# Patient Record
Sex: MALE | Race: WHITE | Employment: FULL TIME | URBAN - METROPOLITAN AREA
[De-identification: names, ages, dates, MRNs, and addresses within clinical notes are randomized per-mention and may not be internally consistent; named-entity substitution may affect disease eponyms.]

---

## 2017-01-04 ENCOUNTER — HOSPITAL ENCOUNTER (OUTPATIENT)
Dept: PHYSICAL THERAPY | Age: 27
Discharge: HOME | End: 2017-01-04
Payer: COMMERCIAL

## 2017-01-04 PROCEDURE — 97110 THERAPEUTIC EXERCISES: CPT

## 2017-01-04 PROCEDURE — 97140 MANUAL THERAPY 1/> REGIONS: CPT

## 2017-01-04 NOTE — PROGRESS NOTES
PHYSICAL THERAPY - DAILY TREATMENT NOTE    Patient Name: Merrill Ochoa        Date: 2017  : 1990   YES Patient  Verified  Visit #:     Insurance: Payor: Taj Ferraro / Plan: Matt Bourne / Product Type: HMO /      In time: 8281 Out time: 286   Total Treatment Time: 70     Medicare Time Tracking (below)   Total Timed Codes (min):   1:1 Treatment Time:       TREATMENT AREA =  Left shoulder pain [M25.512]  Shoulder instability, left [M25.312]  SUBJECTIVE  Pain Level (on 0 to 10 scale):  4  / 10   Medication Changes/New allergies or changes in medical history, any new surgeries or procedures?     NO    If yes, update Summary List   Subjective Functional Status/Changes:  []  No changes reported     My shoulder is the most sore during the day       OBJECTIVE  Modalities Rationale:     decrease inflammation, decrease pain and increase tissue extensibility to improve patient's ability to perform functional ADLs   min [] Estim, type/location:                                      []  att     []  unatt     []  w/US     []  w/ice    []  w/heat    min []  Mechanical Traction: type/lbs                   []  pro   []  sup   []  int   []  cont    []  before manual    []  after manual    min []  Ultrasound, settings/location:      min []  Iontophoresis w/ dexamethasone, location:                                               []  take home patch       []  in clinic   10 min [x]  Ice     []  Heat    location/position: L shoulder in supine    Min []  Vasopneumatic Device, press/temp:     min []  Other:    [] Skin assessment post-treatment (if applicable):    []  intact    []  redness- no adverse reaction     []redness - adverse reaction:        45 min Therapeutic Exercise:  [x]  See flow sheet   Rationale:      increase ROM and increase strength to improve the patients ability to regain full functional mobility of L shoulder for ADLs and RTW     15 min Manual Therapy: PROM in all planes   Rationale: decrease pain, increase ROM, increase tissue extensibility and decrease trigger points to improve the patient's ability to regain full functional mobility     min Therapeutic Activity:    Rationale:    increase strength, improve coordination and increase proprioception to improve the patients ability to      min Neuromuscular Re-ed:    Rationale:    improve coordination, improve balance and increase proprioception to improve the patients ability to      min Gait Training:    Rationale:       min Patient Education:  YES  Reviewed HEP   [x]  Progressed/Changed HEP based on:   Issued written HEP and reviewed     Other Objective/Functional Measures: Added sleeper stretch  Continues to have end range flexion and ER tightness    VC to maintain scapular depression during scaption >90°     Post Treatment Pain Level (on 0 to 10) scale:   0  / 10     ASSESSMENT  Assessment/Changes in Function:     Progressed treatment as appropriate with good patient tolerance, PROM remains limited     []  See Progress Note/Recertification   Patient will continue to benefit from skilled PT services to modify and progress therapeutic interventions, address functional mobility deficits, address ROM deficits, address strength deficits, analyze and address soft tissue restrictions, analyze and cue movement patterns, analyze and modify body mechanics/ergonomics and assess and modify postural abnormalities to attain remaining goals.    Progress toward goals / Updated goals:    Good progress towards strength goals     PLAN  [x]  Upgrade activities as tolerated YES Continue plan of care   []  Discharge due to :    []  Other:      Therapist: Norbert Smith PT, DPT, MTC, CMTPT    Date: 1/4/2017 Time: 6:55 PM     Future Appointments  Date Time Provider Jeannine Garcia   1/11/2017 11:00 AM Rodolfo Salinas PT [facility] [place]   1/18/2017 12:00 PM Rodolfo Salinas PT [facility] [place]   1/25/2017 12:00 PM Rodolfo Salinas PT [facility] [place]

## 2017-01-11 ENCOUNTER — HOSPITAL ENCOUNTER (OUTPATIENT)
Dept: PHYSICAL THERAPY | Age: 27
Discharge: HOME | End: 2017-01-11
Payer: COMMERCIAL

## 2017-01-11 PROCEDURE — 97110 THERAPEUTIC EXERCISES: CPT

## 2017-01-11 PROCEDURE — 97140 MANUAL THERAPY 1/> REGIONS: CPT

## 2017-01-12 NOTE — PROGRESS NOTES
PHYSICAL THERAPY - DAILY TREATMENT NOTE    Patient Name: Mable Allen        Date: 2017  : 1990   YES Patient  Verified  Visit #:     Insurance: Payor: Skye Mckenna / Plan: Nubia Rascon / Product Type: HMO /      In time: 1110 Out time: 65   Total Treatment Time: 70     Medicare Time Tracking (below)   Total Timed Codes (min):   1:1 Treatment Time:       TREATMENT AREA =  Left shoulder pain [M25.512]  Shoulder instability, left [M25.312]  SUBJECTIVE  Pain Level (on 0 to 10 scale):  4  / 10   Medication Changes/New allergies or changes in medical history, any new surgeries or procedures?     NO    If yes, update Summary List   Subjective Functional Status/Changes:  []  No changes reported     i do the exercises once a day and maybe the wall stretches every other day bc they make me sore       OBJECTIVE  Modalities Rationale:     decrease inflammation, decrease pain and increase tissue extensibility to improve patient's ability to perform functional ADLs   min [] Estim, type/location:                                      []  att     []  unatt     []  w/US     []  w/ice    []  w/heat    min []  Mechanical Traction: type/lbs                   []  pro   []  sup   []  int   []  cont    []  before manual    []  after manual    min []  Ultrasound, settings/location:      min []  Iontophoresis w/ dexamethasone, location:                                               []  take home patch       []  in clinic   10 min [x]  Ice     []  Heat    location/position: L shoulder in supine    Min []  Vasopneumatic Device, press/temp:     min []  Other:    [] Skin assessment post-treatment (if applicable):    []  intact    []  redness- no adverse reaction     []redness - adverse reaction:        45 min Therapeutic Exercise:  [x]  See flow sheet   Rationale:      increase ROM and increase strength to improve the patients ability to regain full functional mobility of L shoulder for ADLs and RTW     15 min Manual Therapy: PROM in all planes   Rationale:      decrease pain, increase ROM, increase tissue extensibility and decrease trigger points to improve the patient's ability to regain full functional mobility     min Therapeutic Activity:    Rationale:    increase strength, improve coordination and increase proprioception to improve the patients ability to      min Neuromuscular Re-ed:    Rationale:    improve coordination, improve balance and increase proprioception to improve the patients ability to      min Gait Training:    Rationale:       min Patient Education:  YES  Reviewed HEP   [x]  Progressed/Changed HEP based on:   Issued written HEP and reviewed     Other Objective/Functional Measures:  Reviewed importance of performing stretches in pain free ROM only to avoid inc inflammation/pain    Recommended inc frequency of stretching HEP to 2-3x/day due to cont dec in ROM    Added body blade     Post Treatment Pain Level (on 0 to 10) scale:   0  / 10     ASSESSMENT  Assessment/Changes in Function:     Progressed treatment as appropriate with good patient tolerance, PROM remains limited     []  See Progress Note/Recertification   Patient will continue to benefit from skilled PT services to modify and progress therapeutic interventions, address functional mobility deficits, address ROM deficits, address strength deficits, analyze and address soft tissue restrictions, analyze and cue movement patterns, analyze and modify body mechanics/ergonomics and assess and modify postural abnormalities to attain remaining goals.    Progress toward goals / Updated goals:    Slow but steady progress towards ROM goals     PLAN  [x]  Upgrade activities as tolerated YES Continue plan of care   []  Discharge due to :    []  Other:      Therapist: Sona Javier PT, DPT, MTC, CMTPT    Date: 1/11/2017 Time: 6:55 PM     Future Appointments  Date Time Provider Jeannine Garcia   1/18/2017 12:00 PM Oracioia Meenakshi KASPER PTA [facility] [place] [facility] 1/25/2017 12:00 PM Merlin Reynolds, PT [facility] [place]

## 2017-01-18 ENCOUNTER — APPOINTMENT (OUTPATIENT)
Dept: PHYSICAL THERAPY | Age: 27
End: 2017-01-18
Payer: COMMERCIAL

## 2017-01-20 ENCOUNTER — HOSPITAL ENCOUNTER (OUTPATIENT)
Dept: PHYSICAL THERAPY | Age: 27
Discharge: HOME | End: 2017-01-20
Payer: COMMERCIAL

## 2017-01-20 PROCEDURE — 97140 MANUAL THERAPY 1/> REGIONS: CPT

## 2017-01-20 PROCEDURE — 97110 THERAPEUTIC EXERCISES: CPT

## 2017-01-20 NOTE — PROGRESS NOTES
PHYSICAL THERAPY - DAILY TREATMENT NOTE    Patient Name: Cha Guzman        Date: 2017  : 1990   YES Patient  Verified  Visit #:   15   of      Insurance: Payor: Isi Murillo / Plan: Faith Guerra / Product Type: HMO /      In time: 4 Out time: 515   Total Treatment Time: 75     TREATMENT AREA =  Left shoulder pain [M25.512]  Shoulder instability, left [M25.312]    SUBJECTIVE  Pain Level (on 0 to 10 scale):  0  / 10   Medication Changes/New allergies or changes in medical history, any new surgeries or procedures?     NO    If yes, update Summary List   Subjective Functional Status/Changes:  []  No changes reported     Functional improvements: easier to lift overhead without compensation   Functional impairments: full AROM       OBJECTIVE  Modalities Rationale:     decrease inflammation and decrease pain to improve patient's ability to perform chores this evening   min [] Estim, type/location:                                      []  att     []  unatt     []  w/US     []  w/ice    []  w/heat    min []  Mechanical Traction: type/lbs                   []  pro   []  sup   []  int   []  cont    []  before manual    []  after manual    min []  Ultrasound, settings/location:      min []  Iontophoresis w/ dexamethasone, location:                                               []  take home patch       []  in clinic   10 min [x]  Ice     []  Heat    location/position:     min []  Vasopneumatic Device, press/temp:     min []  Other:    [] Skin assessment post-treatment (if applicable):    []  intact    []  redness- no adverse reaction     []redness - adverse reaction:        15 min Manual Therapy: Technique:      [x] S/DTM []IASTM [x]PROM [] Passive Stretching   []manual TPR    []Jt manipulation:Gr I [] II []  III [] IV[] V[]  Treatment Area:     Rationale:      decrease pain, increase ROM and increase tissue extensibility to improve patient's ability to raise arm overhead     50 min Therapeutic Exercise: [x]  See flow sheet   Rationale:      increase ROM, increase strength, improve coordination and increase proprioception to improve the patients ability to increased activity tolerance          min Neuromuscular Re-ed:    Rationale:    increase ROM to improve the patients ability to          min Therapeutic Activity:    Rationale:    increase ROM to improve the patients ability to         min Gait Training:    Rationale:         min Patient Education:  YES  Reviewed HEP   []  Progressed/Changed HEP based on: Other Objective/Functional Measures:    Compliant and independent with HEP  PROM  deg, ER 45 @60,      Post Treatment Pain Level (on 0 to 10) scale:   0  / 10     ASSESSMENT  Assessment/Changes in Function:     Patient tolerated treatment well today. PROM remains limited     []  See Progress Note/Recertification   Patient will continue to benefit from skilled PT services to modify and progress therapeutic interventions, address functional mobility deficits, address ROM deficits, address strength deficits, analyze and modify body mechanics/ergonomics and assess and modify postural abnormalities to attain remaining goals.    Progress toward goals / Updated goals:    Slow but steady progress towards ROM goals     PLAN  []  Upgrade activities as tolerated YES Continue plan of care   []  Discharge due to :    []  Other:      Therapist: Ellie Salas PTA    Date: 1/20/2017 Time: 4:42 PM     Future Appointments  Date Time Provider Jeannine Garcia   1/25/2017 12:00 PM Coy Mello PT [facility] [place]

## 2017-01-23 ENCOUNTER — HOSPITAL ENCOUNTER (OUTPATIENT)
Dept: PHYSICAL THERAPY | Age: 27
Discharge: HOME | End: 2017-01-23
Payer: COMMERCIAL

## 2017-01-23 PROCEDURE — 97140 MANUAL THERAPY 1/> REGIONS: CPT

## 2017-01-23 PROCEDURE — 97110 THERAPEUTIC EXERCISES: CPT

## 2017-01-25 ENCOUNTER — APPOINTMENT (OUTPATIENT)
Dept: PHYSICAL THERAPY | Age: 27
End: 2017-01-25
Payer: COMMERCIAL

## 2017-01-27 NOTE — PROGRESS NOTES
[facility] [address] [place] [address] [facility], [address] [place] [address], [address]  Phone: [phone]  Fax: [phone]  PROGRESS NOTE  Patient Name: Wily Nj : 1990   Treatment/Medical Diagnosis: Left shoulder pain [M25.512]  Shoulder instability, left [M25.312]   Referral Source: Misty Brody,*     Date of Initial Visit: 16 Attended Visits: 14 Missed Visits:      SUMMARY OF TREATMENT  Therapeutic exercise including ROM, stretching, gentle strengthening, scapular stabilization training, postural ed, patient education, HEP instruction, CP, manual therapy therapy including passive stretching. CURRENT STATUS  Mr. Margareth Malodnado was last seen for PT on 17 & continues to report fairly constant c/o pain in L shoulder, passive mobility of shoulder continues to be limited with good tolerance to manual stretching. He continues to have weakness with active elevation & slight scapular substitution with active elevation. Slow but steady progress with PT. Goal/Measure of Progress Goal Met? 1. Improve L shoulder AROM for flex/scaption to 130 degrees in standing, [email or web address] in supine to 45 deg, [email or web address] level of L4 so ROM is available for dressing activities and/or overhead reaching. Status at last Eval: PROM (supine)  [email or web address]: 40   Flex 155 deg Current Status: PROM: flex 160  ABD: 90 deg  [email or web address]: 60 deg progressing   2. Improve overall strength of L shoulder to 4/5 so strength is available for return to light lifting activities at work/home. Status at last Eval: NA Current Status: TBA progressing   3. Improve FOTO score from 59 points to > or = 65 points indicating improved tolerance with ADLs in regards to L shoulder. Status at last Eval: 59 points Current Status: TBA progressing     New Goals to be achieved in __4__  weeks:  1.   Patient to report 50% improvement in overall function in preparation for return to recreational activities with manageable sx in L shoulder. 2.  Improve L shoulder AROM for flex/scaption to 130 degrees in standing, [email or web address] in supine to 45 deg, [email or web address] level of L4 so ROM is available for dressing activities and/or overhead reaching. 3.  Improve overall strength of L shoulder to 4/5 so strength is available for return to light lifting activities at work/home. 4.  Improve FOTO score from 59 points to > or = 65 points indicating improved tolerance with ADLs in regards to L shoulder. G-Codes (GP): NA  RECOMMENDATIONS  Patient to continue with PT for up to 3-4 more weeks in order to progress towards achieving all LTGs. If you have any questions/comments please contact us directly at [phone]. Thank you for allowing us to assist in the care of your patient. Therapist Signature: YESENIA Peña, cert MDT Date: 3/90/4913     Time: 10:32 AM   NOTE TO PHYSICIAN:  PLEASE COMPLETE THE ORDERS BELOW AND FAX TO   [facility]: [phone]. If you are unable to process this request in 24 hours please contact our office: [phone].    ___ I have read the above report and request that my patient continue as recommended.   ___ I have read the above report and request that my patient continue therapy with the following changes/special instructions:_________________________________________________________   ___ I have read the above report and request that my patient be discharged from therapy.      Physician Signature:        Date:       Time:

## 2017-01-30 ENCOUNTER — HOSPITAL ENCOUNTER (OUTPATIENT)
Dept: PHYSICAL THERAPY | Age: 27
Discharge: HOME | End: 2017-01-30
Payer: COMMERCIAL

## 2017-01-30 PROCEDURE — 97110 THERAPEUTIC EXERCISES: CPT

## 2017-01-30 PROCEDURE — 97140 MANUAL THERAPY 1/> REGIONS: CPT

## 2017-01-30 NOTE — PROGRESS NOTES
PHYSICAL THERAPY - DAILY TREATMENT NOTE    Patient Name: Garo Sprague        Date: 2017  : 1990   YES Patient  Verified  Visit #:   15   of   30  Insurance: Payor: Ron Palomares / Plan: Ellie Hands / Product Type: HMO /      In time: 710 A Out time: 8:30 A   Total Treatment Time: 80/40     Medicare Time Tracking (below)   Total Timed Codes (min):  NA 1:1 Treatment Time:  NA     TREATMENT AREA =  L shoulder    SUBJECTIVE  Pain Level (on 0 to 10 scale):  0  / 10   Medication Changes/New allergies or changes in medical history, any new surgeries or procedures? NO    If yes, update Summary List   Subjective Functional Status/Changes:  []  No changes reported     Patient reports f/u with MD went well, he would like him to continue with PT to work on his strength.           OBJECTIVE  Modalities Rationale:     decrease pain to improve patient's ability to return to lifting    min [] Estim, type/location:                                      []  att     []  unatt     []  w/US     []  w/ice    []  w/heat    min []  Mechanical Traction: type/lbs                   []  pro   []  sup   []  int   []  cont    []  before manual    []  after manual    min []  Ultrasound, settings/location:      min []  Iontophoresis w/ dexamethasone, location:                                               []  take home patch       []  in clinic   10 min [x]  Ice     []  Heat    location/position: Supine to L shoulder     min []  Vasopneumatic Device, press/temp:     min []  Other:    [] Skin assessment post-treatment (if applicable):    []  intact    []  redness- no adverse reaction     []redness - adverse reaction:        60/  30 min Therapeutic Exercise:  [x]  See flow sheet   Rationale:      increase ROM and increase strength to improve the patients ability to return to pain-free llifting      10 min Manual Therapy: Gentle manual stretch for scaption, ER, inf GHJ mob in supine    Rationale:      decrease pain and increase ROM to improve patient's ability to return to indep dressing      min Therapeutic Activity:    Rationale:      min Neuromuscular Re-ed:    Rationale:        min Gait Training:    Rationale:       min Patient Education:  YES  Reviewed HEP   []  Progressed/Changed HEP based on: Other Objective/Functional Measures:    Progressed PREs per flow sheet     Post Treatment Pain Level (on 0 to 10) scale:   0  / 10     ASSESSMENT  Assessment/Changes in Function:   Good tolerance to strength progressions today      []  See Progress Note/Recertification   Patient will continue to benefit from skilled PT services to modify and progress therapeutic interventions, address functional mobility deficits, address ROM deficits, analyze and address soft tissue restrictions and instruct in home and community integration to attain remaining goals. Progress toward goals / Updated goals:    Progressing towards newly established LTGs     PLAN  [x]  Upgrade activities as tolerated YES Continue plan of care   []  Discharge due to :    []  Other:      Therapist: Roma King PT    Date: 1/30/2017 Time: 7:45 AM     No future appointments.

## 2017-02-01 ENCOUNTER — HOSPITAL ENCOUNTER (OUTPATIENT)
Dept: PHYSICAL THERAPY | Age: 27
Discharge: HOME | End: 2017-02-01
Payer: COMMERCIAL

## 2017-02-01 PROCEDURE — 97110 THERAPEUTIC EXERCISES: CPT

## 2017-02-01 PROCEDURE — 97140 MANUAL THERAPY 1/> REGIONS: CPT

## 2017-02-01 NOTE — PROGRESS NOTES
PHYSICAL THERAPY - DAILY TREATMENT NOTE      Patient Name: Gino Colon        Date: 2017  : 1990   YES Patient  Verified  Visit #:     Insurance: Payor: Chele Mcfadden / Plan: Dianne Younger / Product Type: HMO /      In time: 2:55 Out time: 4:05   Total Treatment Time: 70     Medicare Time Tracking (below)   Total Timed Codes (min):   1:1 Treatment Time:       TREATMENT AREA =  L Shoulder    SUBJECTIVE    Pain Level (on 0 to 10 scale):  0  / 10   Medication Changes/New allergies or changes in medical history, any new surgeries or procedures? NO    If yes, update Summary List   Subjective Functional Status/Changes:  []  No changes reported     \"I still have pain and trouble reaching behind my back and if I try to rest my arm over my forehead. \"         OBJECTIVE    Modalities Rationale:  decrease pain to improve patient's ability to return to lifting       min [] Estim, type/location:                                      []  att     []  unatt     []  w/US     []  w/ice    []  w/heat    min []  Mechanical Traction: type/lbs                   []  pro   []  sup   []  int   []  cont    []  before manual    []  after manual    min []  Ultrasound, settings/location:      min []  Iontophoresis w/ dexamethasone, location:                                               []  take home patch       []  in clinic   10 min [x]  Ice     []  Heat    location/position: Supine to L Shoulder    min []  Vasopneumatic Device, press/temp:     min []  Other:    [x] Skin assessment post-treatment (if applicable):    [x]  intact    []  redness- no adverse reaction     []redness - adverse reaction:      50 min Therapeutic Exercise:  [x]  See flow sheet   Rationale:   increase ROM and increase strength to improve the patients ability to return to pain-free llifting       min Therapeutic Activity:    Rationale:      min Neuromuscular Re-ed:    Rationale:     10 min Manual Therapy: Gentle manual stretch for scaption, ER, inf GHJ mob in supine    Rationale:   decrease pain and increase ROM to improve patient's ability to return to indep dressing      min Gait Training:    Rationale:      X min Patient Education:  YES  Reviewed HEP   []  Progressed/Changed HEP based on: Other Objective/Functional Measures:    L Shoulder PROM: Flex:160 Abd:90  [email or web address]  Initiated SL horiz abd with 1# and flex/abd @ wall. Post Treatment Pain Level (on 0 to 10) scale:   0  / 10     ASSESSMENT    Assessment/Changes in Function:     Both P/AROM remain limited with tight endranges into flex/abd. Improved reach behind back to T11.     []  See Progress Note/Recertification   Patient will continue to benefit from skilled PT services to modify and progress therapeutic interventions, address functional mobility deficits, address ROM deficits, analyze and address soft tissue restrictions and instruct in home and community integration to attain remaining goals. Progress toward goals / Updated goals:    Slow progress towards strength and ROM goals.       PLAN    [x]  Upgrade activities as tolerated YES Continue plan of care   []  Discharge due to :    []  Other:      Therapist: Julia Rodriguez PTA    Date: 2/1/2017 Time: 2:51 PM

## 2017-02-03 ENCOUNTER — APPOINTMENT (OUTPATIENT)
Dept: PHYSICAL THERAPY | Age: 27
End: 2017-02-03
Payer: COMMERCIAL

## 2017-02-06 ENCOUNTER — HOSPITAL ENCOUNTER (OUTPATIENT)
Dept: PHYSICAL THERAPY | Age: 27
Discharge: HOME | End: 2017-02-06
Payer: COMMERCIAL

## 2017-02-06 PROCEDURE — 97140 MANUAL THERAPY 1/> REGIONS: CPT

## 2017-02-06 PROCEDURE — 97110 THERAPEUTIC EXERCISES: CPT

## 2017-02-06 NOTE — PROGRESS NOTES
PHYSICAL THERAPY - DAILY TREATMENT NOTE    Patient Name: El Mccall        Date: 2017  : 1990   YES Patient  Verified  Visit #:     Insurance: Payor: Maryam Venegas / Plan: Angélica Jimenez / Product Type: HMO /      In time: 7:10 A Out time: 8:22 A   Total Treatment Time: 70/45     Medicare Time Tracking (below)   Total Timed Codes (min):  NA 1:1 Treatment Time:  NA     TREATMENT AREA =  L shoulder     SUBJECTIVE  Pain Level (on 0 to 10 scale):  0  / 10   Medication Changes/New allergies or changes in medical history, any new surgeries or procedures? NO    If yes, update Summary List   Subjective Functional Status/Changes:  []  No changes reported     Patient reports no new complaints since last treatment, feeling stronger with L shoulder.            OBJECTIVE  Modalities Rationale:     decrease pain to improve patient's ability to return to repetitive overhead activities   min [] Estim, type/location:                                      []  att     []  unatt     []  w/US     []  w/ice    []  w/heat    min []  Mechanical Traction: type/lbs                   []  pro   []  sup   []  int   []  cont    []  before manual    []  after manual    min []  Ultrasound, settings/location:      min []  Iontophoresis w/ dexamethasone, location:                                               []  take home patch       []  in clinic   10 min [x]  Ice     []  Heat    location/position: Supine to L shoulder     min []  Vasopneumatic Device, press/temp:     min []  Other:    [] Skin assessment post-treatment (if applicable):    []  intact    []  redness- no adverse reaction     []redness - adverse reaction:        50/  35 min Therapeutic Exercise:  [x]  See flow sheet   Rationale:      increase strength, improve balance and increase proprioception to improve the patients ability to return to light lifting      10 min Manual Therapy: Gentle manual stretching all planes, [email or web address] in supine   Rationale: Increase ROM so mobility is available for dressing     min Therapeutic Activity:    Rationale:         min Neuromuscular Re-ed:    Rationale:       min Gait Training:    Rationale:       min Patient Education:  YES  Reviewed HEP   []  Progressed/Changed HEP based on: Other Objective/Functional Measures:    TE per flow sheet      Post Treatment Pain Level (on 0 to 10) scale:   0  / 10     ASSESSMENT  Assessment/Changes in Function:     Good tolerance to current program, attempted prone 6 phase, difficulty with #2, given capsular tightness with ER     []  See Progress Note/Recertification   Patient will continue to benefit from skilled PT services to modify and progress therapeutic interventions, address functional mobility deficits, address ROM deficits, address strength deficits, assess and modify postural abnormalities, address imbalance/dizziness and instruct in home and community integration to attain remaining goals.    Progress toward goals / Updated goals:    Progressing towards STG 2     PLAN  [x]  Upgrade activities as tolerated YES Continue plan of care   []  Discharge due to :    []  Other:      Therapist: Geovanna Rubin, PT    Date: 2/6/2017 Time: 8:29 AM     Future Appointments  Date Time Provider Jeannine Garcia   2/8/2017 2:30 PM Pedro Escudero, PT [facility] [place]   2/13/2017 7:00 AM Geovanna Rubin, PT [facility] [place]   2/16/2017 9:00 AM Geovanna Rubin, PT [facility]

## 2017-02-08 ENCOUNTER — APPOINTMENT (OUTPATIENT)
Dept: PHYSICAL THERAPY | Age: 27
End: 2017-02-08
Payer: COMMERCIAL

## 2017-02-09 ENCOUNTER — HOSPITAL ENCOUNTER (OUTPATIENT)
Dept: PHYSICAL THERAPY | Age: 27
Discharge: HOME | End: 2017-02-09
Payer: COMMERCIAL

## 2017-02-09 ENCOUNTER — APPOINTMENT (OUTPATIENT)
Dept: PHYSICAL THERAPY | Age: 27
End: 2017-02-09
Payer: COMMERCIAL

## 2017-02-09 PROCEDURE — 97140 MANUAL THERAPY 1/> REGIONS: CPT

## 2017-02-09 PROCEDURE — 97110 THERAPEUTIC EXERCISES: CPT

## 2017-02-09 NOTE — PROGRESS NOTES
PHYSICAL THERAPY - DAILY TREATMENT NOTE      Patient Name: Latisha Carver        Date: 2017  : 1990   YES Patient  Verified  Visit #:      30  Insurance: Payor: Shahidanupama Laws / Plan: Hipolito Linton / Product Type: HMO /      In time: 10:00 Out time: 11:20   Total Treatment Time: 70     Medicare Time Tracking (below)   Total Timed Codes (min):   1:1 Treatment Time:       TREATMENT AREA =  L Shoulder    SUBJECTIVE    Pain Level (on 0 to 10 scale):  0  / 10   Medication Changes/New allergies or changes in medical history, any new surgeries or procedures? NO    If yes, update Summary List   Subjective Functional Status/Changes:  []  No changes reported     No new complaints. Primary c/o is tightness with reaching overhead and behind the back.       OBJECTIVE    Modalities Rationale: decrease pain to improve patient's ability to return to repetitive overhead activities       min [] Estim, type/location:                                      []  att     []  unatt     []  w/US     []  w/ice    []  w/heat    min []  Mechanical Traction: type/lbs                   []  pro   []  sup   []  int   []  cont    []  before manual    []  after manual    min []  Ultrasound, settings/location:      min []  Iontophoresis w/ dexamethasone, location:                                               []  take home patch       []  in clinic   10 min [x]  Ice     []  Heat    location/position: Supine to L Shoulder    min []  Vasopneumatic Device, press/temp:     min []  Other:    [x] Skin assessment post-treatment (if applicable):    [x]  intact    []  redness- no adverse reaction     []redness - adverse reaction:      50 min Therapeutic Exercise:  [x]  See flow sheet   Rationale:  increase strength, improve balance and increase proprioception to improve the patients ability to return to light lifting       min Therapeutic Activity:    Rationale:      min Neuromuscular Re-ed:    Rationale:     10 min Manual Therapy: Gentle manual stretching all planes, [email or web address] in supine   Rationale:  Increase ROM so mobility is available for dressing     min Gait Training:    Rationale:      X min Patient Education:  YES  Reviewed HEP   []  Progressed/Changed HEP based on: Other Objective/Functional Measures:    TE per flow sheet. Progressed reps with table TE. Post Treatment Pain Level (on 0 to 10) scale:   0  / 10     ASSESSMENT    Assessment/Changes in Function:     Capsular tighness cont with ExtRot>IntRot. Good tolerance to strength progressions. []  See Progress Note/Recertification   Patient will continue to benefit from skilled PT services to modify and progress therapeutic interventions, address functional mobility deficits, address ROM deficits, address strength deficits, assess and modify postural abnormalities, address imbalance/dizziness and instruct in home and community integration to attain remaining goals.    Progress toward goals / Updated goals:    Progressing towards STG 2     PLAN    [x]  Upgrade activities as tolerated YES Continue plan of care   []  Discharge due to :    []  Other:      Therapist: Yuki Martinez PTA    Date: 2/9/2017 Time: 7:27 AM

## 2017-02-13 ENCOUNTER — HOSPITAL ENCOUNTER (OUTPATIENT)
Dept: PHYSICAL THERAPY | Age: 27
Discharge: HOME | End: 2017-02-13
Payer: COMMERCIAL

## 2017-02-13 PROCEDURE — 97140 MANUAL THERAPY 1/> REGIONS: CPT

## 2017-02-13 PROCEDURE — 97110 THERAPEUTIC EXERCISES: CPT

## 2017-02-13 NOTE — PROGRESS NOTES
PHYSICAL THERAPY - DAILY TREATMENT NOTE    Patient Name: Jeri Prior        Date: 2017  : 1990   YES Patient  Verified  Visit #:     Insurance: Payor: Alyse Yip / Plan: Danielle Albright / Product Type: HMO /      In time: 7:10 A Out time: 8:30 A   Total Treatment Time: 75/35     Medicare Time Tracking (below)   Total Timed Codes (min):  NA 1:1 Treatment Time:  NA     TREATMENT AREA =  L shoulder    SUBJECTIVE  Pain Level (on 0 to 10 scale):  0  / 10   Medication Changes/New allergies or changes in medical history, any new surgeries or procedures? NO    If yes, update Summary List   Subjective Functional Status/Changes:  []  No changes reported     Patient reports no new complaints since last treatment.            OBJECTIVE  Modalities Rationale:     decrease pain to improve patient's ability to return to pain-free use of L shoulder with ADLs    min [] Estim, type/location:                                      []  att     []  unatt     []  w/US     []  w/ice    []  w/heat    min []  Mechanical Traction: type/lbs                   []  pro   []  sup   []  int   []  cont    []  before manual    []  after manual    min []  Ultrasound, settings/location:      min []  Iontophoresis w/ dexamethasone, location:                                               []  take home patch       []  in clinic   10 min [x]  Ice     []  Heat    location/position: Supine to L shoulder     min []  Vasopneumatic Device, press/temp:     min []  Other:    [] Skin assessment post-treatment (if applicable):    []  intact    []  redness- no adverse reaction     []redness - adverse reaction:        55/  25 min Therapeutic Exercise:  [x]  See flow sheet   Rationale:      increase ROM and increase strength to improve the patients ability to return to light lifting     10 min Manual Therapy: Gentle manual stretch of L shoulder for scaption, ER/IR in supine   Rationale:      increase ROM to improve patient's ability to return to overhead reaching     min Therapeutic Activity:    Rationale:      min Neuromuscular Re-ed:    Rationale:        min Gait Training:    Rationale:       min Patient Education:  Homa Dumont   []  Progressed/Changed HEP based on: Other Objective/Functional Measures:  Progressed PREs per flow sheet, added scapular clock with YTB today      Post Treatment Pain Level (on 0 to 10) scale:   0  / 10     ASSESSMENT  Assessment/Changes in Function:   Good tolerance to strength progressions today      []  See Progress Note/Recertification   Patient will continue to benefit from skilled PT services to modify and progress therapeutic interventions, address ROM deficits, address strength deficits, assess and modify postural abnormalities and instruct in home and community integration to attain remaining goals.    Progress toward goals / Updated goals:    Progressing towards LTG 1, 2     PLAN  [x]  Upgrade activities as tolerated YES Continue plan of care   []  Discharge due to :    []  Other:      Therapist: Stu Pryor PT    Date: 2/13/2017 Time: 9:16 AM     Future Appointments  Date Time Provider Jeannine Garcia   2/16/2017 9:00 AM Stu Pryor PT [facility]   2/20/2017 6:30 AM Stu Pryor PT [facility]   2/23/2017 11:00 AM Hemalatha KASPER PTA [facility] [place]

## 2017-02-16 ENCOUNTER — HOSPITAL ENCOUNTER (OUTPATIENT)
Dept: PHYSICAL THERAPY | Age: 27
Discharge: HOME | End: 2017-02-16
Payer: COMMERCIAL

## 2017-02-16 PROCEDURE — 97110 THERAPEUTIC EXERCISES: CPT

## 2017-02-16 PROCEDURE — 97140 MANUAL THERAPY 1/> REGIONS: CPT

## 2017-02-16 NOTE — PROGRESS NOTES
PHYSICAL THERAPY - DAILY TREATMENT NOTE    Patient Name: Marge Vaughn        Date: 2017  : 1990   YES Patient  Verified  Visit #:     Insurance: Payor: Sandra Osorio / Plan: Kerrie Moss / Product Type: HMO /       In time: 9:00 A Out time: 10:20 A   Total Treatment Time: 70/35     Medicare Time Tracking (below)   Total Timed Codes (min):  NA 1:1 Treatment Time:  NA     TREATMENT AREA =  L shoulder    SUBJECTIVE  Pain Level (on 0 to 10 scale):  0  / 10   Medication Changes/New allergies or changes in medical history, any new surgeries or procedures? NO    If yes, update Summary List   Subjective Functional Status/Changes:  []  No changes reported     Patient reports no new complaints since last treatment.         OBJECTIVE  Modalities Rationale:     decrease pain to improve patient's ability to return to pain-free work activities   min [] Estim, type/location:                                      []  att     []  unatt     []  w/US     []  w/ice    []  w/heat    min []  Mechanical Traction: type/lbs                   []  pro   []  sup   []  int   []  cont    []  before manual    []  after manual    min []  Ultrasound, settings/location:      min []  Iontophoresis w/ dexamethasone, location:                                               []  take home patch       []  in clinic   10 min [x]  Ice     []  Heat    location/position: Supine to L shoulder     min []  Vasopneumatic Device, press/temp:     min []  Other:    [] Skin assessment post-treatment (if applicable):    []  intact    []  redness- no adverse reaction     []redness - adverse reaction:        45/  25 min Therapeutic Exercise:  [x]  See flow sheet   Rationale:      increase ROM and increase strength to improve the patients ability to return to light lifting    15/  10 min Manual Therapy: Gentle PROM/manual stretch for all planes of motion in supine    Rationale:      decrease pain and increase ROM to improve patient's ability to return to pain-free reaching overhead     min Therapeutic Activity:    Rationale:      min Neuromuscular Re-ed:    Rationale:        min Gait Training:    Rationale:       min Patient Education:  YES  Reviewed HEP   []  Progressed/Changed HEP based on: Other Objective/Functional Measures: Added Tband stabs in standing   Post Treatment Pain Level (on 0 to 10) scale:   0  / 10     ASSESSMENT  Assessment/Changes in Function:   Good tolerance to strength progressions today     []  See Progress Note/Recertification   Patient will continue to benefit from skilled PT services to modify and progress therapeutic interventions, address functional mobility deficits, address ROM deficits, address strength deficits, assess and modify postural abnormalities and instruct in home and community integration to attain remaining goals.    Progress toward goals / Updated goals:    Progressing towards LTG 3     PLAN  [x]  Upgrade activities as tolerated YES Continue plan of care   []  Discharge due to :    []  Other:      Therapist: Faith Navarrete PT    Date: 2/16/2017 Time: 10:11 AM     Future Appointments  Date Time Provider Jeannine Garcia   2/20/2017 6:30 AM Annita Gamez [facility] [place]   2/23/2017 11:00 AM Vivi KASPER PTA [facility] [place]

## 2017-02-20 ENCOUNTER — HOSPITAL ENCOUNTER (OUTPATIENT)
Dept: PHYSICAL THERAPY | Age: 27
Discharge: HOME | End: 2017-02-20
Payer: COMMERCIAL

## 2017-02-20 PROCEDURE — 97140 MANUAL THERAPY 1/> REGIONS: CPT

## 2017-02-20 PROCEDURE — 97110 THERAPEUTIC EXERCISES: CPT

## 2017-02-20 NOTE — PROGRESS NOTES
PHYSICAL THERAPY - DAILY TREATMENT NOTE    Patient Name: Tomasz Conte        Date: 2017  : 1990   YES Patient  Verified  Visit #:     Insurance: Payor: Allen Raza / Plan: Blake Vigil / Product Type: HMO /       In time: 7:10 A Out time: 8:30 A   Total Treatment Time: 75/35     Medicare Time Tracking (below)   Total Timed Codes (min):  NA 1:1 Treatment Time:  NA     TREATMENT AREA =  L shoulder    SUBJECTIVE  Pain Level (on 0 to 10 scale):  0  / 10   Medication Changes/New allergies or changes in medical history, any new surgeries or procedures? NO    If yes, update Summary List   Subjective Functional Status/Changes:  []  No changes reported     Patient reports no new complaints since last treatment.         OBJECTIVE  Modalities Rationale:     decrease pain to improve patient's ability to return to pain-free work activities   min [] Estim, type/location:                                      []  att     []  unatt     []  w/US     []  w/ice    []  w/heat    min []  Mechanical Traction: type/lbs                   []  pro   []  sup   []  int   []  cont    []  before manual    []  after manual    min []  Ultrasound, settings/location:      min []  Iontophoresis w/ dexamethasone, location:                                               []  take home patch       []  in clinic   10 min [x]  Ice     []  Heat    location/position: Supine to L shoulder     min []  Vasopneumatic Device, press/temp:     min []  Other:    [] Skin assessment post-treatment (if applicable):    []  intact    []  redness- no adverse reaction     []redness - adverse reaction:        50/  25 min Therapeutic Exercise:  [x]  See flow sheet   Rationale:      increase ROM and increase strength to improve the patients ability to return to light lifting    15/  10 min Manual Therapy: Gentle PROM/manual stretch for all planes of motion in supine    Rationale:      decrease pain and increase ROM to improve patient's ability to return to pain-free reaching overhead     min Therapeutic Activity:    Rationale:      min Neuromuscular Re-ed:    Rationale:        min Gait Training:    Rationale:       min Patient Education:  YES  Reviewed HEP   []  Progressed/Changed HEP based on: Other Objective/Functional Measures:    TE per flow sheet    Post Treatment Pain Level (on 0 to 10) scale:   0  / 10     ASSESSMENT  Assessment/Changes in Function:   Good tolerance to today's treatment      []  See Progress Note/Recertification   Patient will continue to benefit from skilled PT services to modify and progress therapeutic interventions, address functional mobility deficits, address ROM deficits, address strength deficits, assess and modify postural abnormalities and instruct in home and community integration to attain remaining goals.    Progress toward goals / Updated goals:    Progressing towards LTG 3 & 4     PLAN  [x]  Upgrade activities as tolerated YES Continue plan of care   []  Discharge due to :    []  Other:      Therapist: Geovanna Rubin PT    Date: 2/20/2017 Time: 8:30 AM     Future Appointments  Date Time Provider Jeannine Garcia   2/23/2017 11:00 AM Marilyn KASPER PTA [facility] [place]   2/27/2017 7:00 AM Geovanna Rubin PT [facility] [place]   3/2/2017 9:30 AM Geovanna Rubin PT [facility] [place]

## 2017-02-23 ENCOUNTER — HOSPITAL ENCOUNTER (OUTPATIENT)
Dept: PHYSICAL THERAPY | Age: 27
Discharge: HOME | End: 2017-02-23
Payer: COMMERCIAL

## 2017-02-23 PROCEDURE — 97140 MANUAL THERAPY 1/> REGIONS: CPT

## 2017-02-23 PROCEDURE — 97110 THERAPEUTIC EXERCISES: CPT

## 2017-02-23 NOTE — PROGRESS NOTES
PHYSICAL THERAPY - DAILY TREATMENT NOTE      Patient Name: Ortiz Greenwood        Date: 2017  : 1990   YES Patient  Verified  Visit #:     Insurance: Payor: Wang Wynne / Plan: Patrick Mcknight / Product Type: HMO /      In time: 11:20 Out time: 12:30   Total Treatment Time: 70     Medicare Time Tracking (below)   Total Timed Codes (min):   1:1 Treatment Time:       TREATMENT AREA =  L Shoulder    SUBJECTIVE    Pain Level (on 0 to 10 scale):  0  / 10   Medication Changes/New allergies or changes in medical history, any new surgeries or procedures? NO    If yes, update Summary List   Subjective Functional Status/Changes:  []  No changes reported     Primary complaint is difficulty with ExtRot; ie reaching for seat belt.      *20 minutes late*     OBJECTIVE    Modalities Rationale:  decrease pain to improve patient's ability to return to pain-free work activities      min [] Estim, type/location:                                      []  att     []  unatt     []  w/US     []  w/ice    []  w/heat    min []  Mechanical Traction: type/lbs                   []  pro   []  sup   []  int   []  cont    []  before manual    []  after manual    min []  Ultrasound, settings/location:      min []  Iontophoresis w/ dexamethasone, location:                                               []  take home patch       [x]  in clinic   10 min [x]  Ice     []  Heat    location/position: Supine to L Shoulder    min []  Vasopneumatic Device, press/temp:     min []  Other:    [x] Skin assessment post-treatment (if applicable):    [x]  intact    []  redness- no adverse reaction     []redness - adverse reaction:      50 min Therapeutic Exercise:  [x]  See flow sheet   Rationale:   increase ROM and increase strength to improve the patients ability to return to light lifting     min Therapeutic Activity:    Rationale:      min Neuromuscular Re-ed:    Rationale:     10 min Manual Therapy: Gentle PROM/stretching all planes   Rationale:   decrease pain and increase ROM to improve patient's ability to return to pain-free reaching overhead     min Gait Training:    Rationale:      X min Patient Education:  oJhan Bergman   []  Progressed/Changed HEP based on: Other Objective/Functional Measures:    TE per flow sheet' progressed PREs as tolerated. Post Treatment Pain Level (on 0 to 10) scale:   0  / 10     ASSESSMENT    Assessment/Changes in Function:     Doing well with strength progressions but fatigued by end of session. Remains limited with active ExtRot and reach behind back. []  See Progress Note/Recertification   Patient will continue to benefit from skilled PT services to modify and progress therapeutic interventions, address functional mobility deficits, address ROM deficits, address strength deficits, assess and modify postural abnormalities and instruct in home and community integration to attain remaining goals.    Progress toward goals / Updated goals:    Progressing towards LTG 3 & 4     PLAN    [x]  Upgrade activities as tolerated YES Continue plan of care   []  Discharge due to :    []  Other:      Therapist: Hipolito Gonzalez PTA    Date: 2/23/2017 Time: 7:27 AM

## 2017-02-27 ENCOUNTER — HOSPITAL ENCOUNTER (OUTPATIENT)
Dept: PHYSICAL THERAPY | Age: 27
Discharge: HOME | End: 2017-02-27
Payer: COMMERCIAL

## 2017-02-27 PROCEDURE — 97140 MANUAL THERAPY 1/> REGIONS: CPT

## 2017-02-27 PROCEDURE — 97110 THERAPEUTIC EXERCISES: CPT

## 2017-02-27 NOTE — PROGRESS NOTES
PHYSICAL THERAPY - DAILY TREATMENT NOTE    Patient Name: Octaviano Amezquita        Date: 2017  : 1990    YES Patient  Verified  Visit #:     Insurance: Payor: Di Leon / Plan: Sen Dominguez / Product Type: HMO /      In time: 7:15 A Out time: 8:30 A   Total Treatment Time: 70     Medicare Time Tracking (below)   Total Timed Codes (min):  NA 1:1 Treatment Time:       TREATMENT AREA =  L shoulder    SUBJECTIVE  Pain Level (on 0 to 10 scale):  0  / 10   Medication Changes/New allergies or changes in medical history, any new surgeries or procedures? NO    If yes, update Summary List   Subjective Functional Status/Changes:  []  No changes reported     \"I still have difficulty reaching behind my back. \"         OBJECTIVE  Modalities Rationale:     decrease pain to improve patient's ability to perform ADLs   min [] Estim, type/location:                                      []  att     []  unatt     []  w/US     []  w/ice    []  w/heat    min []  Mechanical Traction: type/lbs                   []  pro   []  sup   []  int   []  cont    []  before manual    []  after manual    min []  Ultrasound, settings/location:      min []  Iontophoresis w/ dexamethasone, location:                                               []  take home patch       []  in clinic   10 min [x]  Ice     []  Heat    location/position: Supine with wedge to L shoulder    min []  Vasopneumatic Device, press/temp:     min []  Other:    [] Skin assessment post-treatment (if applicable):    []  intact    []  redness- no adverse reaction     []redness - adverse reaction:        50 min Therapeutic Exercise:  [x]  See flow sheet   Rationale:      increase ROM, increase strength, improve coordination, improve balance and increase proprioception to improve the patients ability to return to recreational activity.      10 min Manual Therapy: Gentle PROM to L shoulder in all planes in supine   Rationale:      decrease pain and increase ROM to improve patient's ability to perform dressing activities. min Therapeutic Activity:    Rationale:         min Neuromuscular Re-ed:    Rationale:        min Gait Training:    Rationale:       min Patient Education:  YES  Reviewed HEP   []  Progressed/Changed HEP based on: Other Objective/Functional Measures:    VC for performance of prone horizontal abduction with scapular activation. Post Treatment Pain Level (on 0 to 10) scale:   0  / 10     ASSESSMENT  Assessment/Changes in Function:     Pt tolerated PT rx well today with no new increases in pain, pt tolerates  manual therapy well with minimal VC for relaxation. []  See Progress Note/Recertification   Patient will continue to benefit from skilled PT services to modify and progress therapeutic interventions, address functional mobility deficits, address ROM deficits, address strength deficits, analyze and address soft tissue restrictions, analyze and cue movement patterns, analyze and modify body mechanics/ergonomics, assess and modify postural abnormalities, address imbalance/dizziness and instruct in home and community integration to attain remaining goals. Progress toward goals / Updated goals:    Progressing towards LTG 2 and 3.      PLAN  [x]  Upgrade activities as tolerated YES Continue plan of care   []  Discharge due to :    []  Other:      Therapist: Shawn Lance, YESENIA, cert MDT    Date: 1/67/8915 Time: 8:59 AM     Future Appointments  Date Time Provider Jeannine Garcia   3/2/2017 9:30 AM Radha Forbes, PT [facility] [place]   3/6/2017 7:00 AM Radha Forbes, PT [facility] [place]   3/9/2017 9:00 AM Cristobal Mejía PT [facility] [place]   3/13/2017 7:00 AM Radha Forbes, PT [facility] [place]

## 2017-03-01 ENCOUNTER — HOSPITAL ENCOUNTER (OUTPATIENT)
Dept: PHYSICAL THERAPY | Age: 27
End: 2017-03-01
Payer: COMMERCIAL

## 2017-03-02 ENCOUNTER — APPOINTMENT (OUTPATIENT)
Dept: PHYSICAL THERAPY | Age: 27
End: 2017-03-02
Payer: COMMERCIAL

## 2017-03-02 ENCOUNTER — HOSPITAL ENCOUNTER (OUTPATIENT)
Dept: PHYSICAL THERAPY | Age: 27
Discharge: HOME | End: 2017-03-02
Payer: COMMERCIAL

## 2017-03-02 PROCEDURE — 97140 MANUAL THERAPY 1/> REGIONS: CPT

## 2017-03-02 PROCEDURE — 97110 THERAPEUTIC EXERCISES: CPT

## 2017-03-02 NOTE — PROGRESS NOTES
PHYSICAL THERAPY - DAILY TREATMENT NOTE    Patient Name: Cipriano Iglesias        Date: 3/2/2017  : 1990   YES Patient  Verified  Visit #:     Insurance: Payor: Phuc Barrera / Plan: Jacinto Hooker / Product Type: HMO /      In time: 8:05 A Out time: 9:40 A   Total Treatment Time: 85     Medicare Time Tracking (below)   Total Timed Codes (min):  NA 1:1 Treatment Time:  NA     TREATMENT AREA =  L shoulder  SUBJECTIVE  Pain Level (on 0 to 10 scale):  0  / 10   Medication Changes/New allergies or changes in medical history, any new surgeries or procedures?     NO    If yes, update Summary List   Subjective Functional Status/Changes:  []  No changes reported     Patient reports no new complaints, he is eager to return to sports & weight lifting       OBJECTIVE  Modalities Rationale:     decrease pain to improve patient's ability to return to pain-free ADLs   min [] Estim, type/location:                                      []  att     []  unatt     []  w/US     []  w/ice    []  w/heat    min []  Mechanical Traction: type/lbs                   []  pro   []  sup   []  int   []  cont    []  before manual    []  after manual    min []  Ultrasound, settings/location:      min []  Iontophoresis w/ dexamethasone, location:                                               []  take home patch       []  in clinic   10 min [x]  Ice     []  Heat    location/position: Supine to L shoulder     min []  Vasopneumatic Device, press/temp:     min []  Other:    [] Skin assessment post-treatment (if applicable):    []  intact    []  redness- no adverse reaction     []redness - adverse reaction:        45 min Therapeutic Exercise:  [x]  See flow sheet   Rationale:      increase ROM and increase strength to improve the patients ability to return to pain     15 min Manual Therapy: Gentle manual stretch in all planes in supine   Rationale:      increase ROM to improve patient's ability to return to pain-free unlimited reaching    15 min Therapeutic Activity: Reviewed progression back to free weights/weight lifting program at gym & simulated ex using cable pulley system, pulldown, narrow  PU with band assist   Rationale: improve strength for return to PLOF     min Neuromuscular Re-ed:    Rationale:        min Gait Training:    Rationale:       min Patient Education:  YES  Reviewed HEP   []  Progressed/Changed HEP based on: Other Objective/Functional Measures:     TE per flow sheet      Post Treatment Pain Level (on 0 to 10) scale:   0  / 10     ASSESSMENT  Assessment/Changes in Function:   Good tolerance to progressive & strengthening ex     []  See Progress Note/Recertification   Patient will continue to benefit from skilled PT services to modify and progress therapeutic interventions, address functional mobility deficits, address ROM deficits, address strength deficits, assess and modify postural abnormalities and instruct in home and community integration to attain remaining goals.    Progress toward goals / Updated goals:    Progressing towards LTGs     PLAN  [x]  Upgrade activities as tolerated YES Continue plan of care   []  Discharge due to :    [x]  Other: Re-assess for PN nv     Therapist: Henok Karimi PT    Date: 3/2/2017 Time: 8:42 AM     Future Appointments  Date Time Provider Jeannine Garcia   3/6/2017 7:00 AM Henok Karimi PT [facility] [place]   3/9/2017 9:00 AM Cody Alvarez, RAFY [facility] [place]   3/13/2017 7:00 AM Henok Karimi PT [facility] [place]

## 2017-03-06 ENCOUNTER — HOSPITAL ENCOUNTER (OUTPATIENT)
Dept: PHYSICAL THERAPY | Age: 27
Discharge: HOME | End: 2017-03-06
Payer: COMMERCIAL

## 2017-03-06 PROCEDURE — 97110 THERAPEUTIC EXERCISES: CPT

## 2017-03-06 PROCEDURE — 97140 MANUAL THERAPY 1/> REGIONS: CPT

## 2017-03-06 NOTE — PROGRESS NOTES
PHYSICAL THERAPY - DAILY TREATMENT NOTE    Patient Name: Jeri Prior        Date: 3/6/2017  : 1990   YES Patient  Verified  Visit #:     Insurance: Payor: Alyse Yip / Plan: Danielle Albright / Product Type: HMO /      In time: 7:05 A Out time: 8:30 A   Total Treatment Time: 75     Medicare Time Tracking (below)   Total Timed Codes (min):  NA 1:1 Treatment Time:  NA     TREATMENT AREA =  L shoulder    SUBJECTIVE  Pain Level (on 0 to 10 scale):  0  / 10   Medication Changes/New allergies or changes in medical history, any new surgeries or procedures?     NO    If yes, update Summary List   Subjective Functional Status/Changes:  []  No changes reported     Patient reports 95% improvement overall since having surgery, still eager to lift weights at the gym          OBJECTIVE  Modalities Rationale:     decrease pain to improve patient's ability to return to pain-free lifting   min [] Estim, type/location:                                      []  att     []  unatt     []  w/US     []  w/ice    []  w/heat    min []  Mechanical Traction: type/lbs                   []  pro   []  sup   []  int   []  cont    []  before manual    []  after manual    min []  Ultrasound, settings/location:      min []  Iontophoresis w/ dexamethasone, location:                                               []  take home patch       []  in clinic   10 min [x]  Ice     []  Heat    location/position: Supine to L shoulder     min []  Vasopneumatic Device, press/temp:     min []  Other:    [] Skin assessment post-treatment (if applicable):    []  intact    []  redness- no adverse reaction     []redness - adverse reaction:        55 min Therapeutic Exercise:  [x]  See flow sheet   Rationale:      increase ROM and increase strength to improve the patients ability to return to gym workouts     10 min Manual Therapy: Gentle ER/flex & scaption stretch in supine    Rationale:      decrease pain and increase ROM to improve patient's ability to return      min Therapeutic Activity:    Rationale:      min Neuromuscular Re-ed:    Rationale:        min Gait Training:    Rationale:       min Patient Education:  Bridgette Lazaro   []  Progressed/Changed HEP based on: Other Objective/Functional Measures:    FOTO 93 points  MMT: flex/ABD 5-/5, ER/IR 5/5  AROM: grossly WFL in all planes, [email or web address]: 90 deg in supine     Post Treatment Pain Level (on 0 to 10) scale:   0  / 10     ASSESSMENT  Assessment/Changes in Function:   Good progress with fxl strength & ROM     []  See Progress Note/Recertification   Patient will continue to benefit from skilled PT services to address functional mobility deficits, address ROM deficits, address strength deficits, analyze and modify body mechanics/ergonomics, assess and modify postural abnormalities and instruct in home and community integration to attain remaining goals. Progress toward goals / Updated goals:     All goals met     PLAN  [x]  Upgrade activities as tolerated YES Continue plan of care   []  Discharge due to :    [x]  Other: Plan to DC after n/v, f/u with MD on Monday     Therapist: Alonso Meier, PT    Date: 3/6/2017 Time: 12:16 PM     Future Appointments  Date Time Provider Jeannine Garcia   3/9/2017 9:00 AM Pearlene Merlin, PT [facility] [place]   3/13/2017 7:00 AM Alonso Meier, PT [facility] [place]

## 2017-03-09 ENCOUNTER — HOSPITAL ENCOUNTER (OUTPATIENT)
Dept: PHYSICAL THERAPY | Age: 27
Discharge: HOME | End: 2017-03-09
Payer: COMMERCIAL

## 2017-03-09 PROCEDURE — 97110 THERAPEUTIC EXERCISES: CPT

## 2017-03-09 NOTE — PROGRESS NOTES
PHYSICAL THERAPY - DAILY TREATMENT NOTE    Patient Name: Max Smith        Date: 3/9/2017  : 1990   YES Patient  Verified  Visit #:   32   of   27  Insurance: Payor: Kyle Dupont / Plan: Mando Santizo / Product Type: HMO /      In time: 875 Out time: 1010   Total Treatment Time: 65     Medicare Time Tracking (below)   Total Timed Codes (min):   1:1 Treatment Time:       TREATMENT AREA =  Left shoulder pain [M25.512]  Shoulder instability, left [M25.312]  SUBJECTIVE  Pain Level (on 0 to 10 scale):  0  / 10   Medication Changes/New allergies or changes in medical history, any new surgeries or procedures? NO    If yes, update Summary List   Subjective Functional Status/Changes:  []  No changes reported     Shoulder is feeling good. What other exercises can I do at the gym?           OBJECTIVE  Modalities Rationale:     decrease inflammation, decrease pain and increase tissue extensibility to improve patient's ability to perform functional ADLs   min [] Estim, type/location:                                      []  att     []  unatt     []  w/US     []  w/ice    []  w/heat    min []  Mechanical Traction: type/lbs                   []  pro   []  sup   []  int   []  cont    []  before manual    []  after manual    min []  Ultrasound, settings/location:      min []  Iontophoresis w/ dexamethasone, location:                                               []  take home patch       []  in clinic   10 min [x]  Ice     []  Heat    location/position: L shoulder in supine    min []  Vasopneumatic Device, press/temp:     min []  Other:    [] Skin assessment post-treatment (if applicable):    []  intact    []  redness- no adverse reaction     []redness - adverse reaction:        55 min Therapeutic Exercise:  [x]  See flow sheet   Rationale:      increase ROM and increase strength to improve the patients ability to regain functional mobility of L shoulder for return to daily workouts     min Manual Therapy: Rationale:      decrease pain, increase ROM, increase tissue extensibility and decrease trigger points to improve the patient's ability to regain full functional mobility     min Therapeutic Activity:    Rationale:    increase strength, improve coordination and increase proprioception to improve the patients ability to      min Neuromuscular Re-ed:    Rationale:    improve coordination, improve balance and increase proprioception to improve the patients ability to      min Gait Training:    Rationale:       min Patient Education:  YES  Reviewed HEP   []  Progressed/Changed HEP based on: Other Objective/Functional Measures:    Reviewed form with workouts and exercises including pec flys (upright/incline), chest press, shoulder press, deadlift, bicep curl, etc    Reviewed importance of scap stabilization when racking/unracking weights to prevent distraction load on shoulder     Post Treatment Pain Level (on 0 to 10) scale:   0  / 10     ASSESSMENT  Assessment/Changes in Function:     Progressed treatment as appropriate with good patient tolerance     []  See Progress Note/Recertification   Patient will continue to benefit from skilled PT services to modify and progress therapeutic interventions, address functional mobility deficits, address ROM deficits, address strength deficits, analyze and address soft tissue restrictions, analyze and cue movement patterns, analyze and modify body mechanics/ergonomics and assess and modify postural abnormalities to attain remaining goals.    Progress toward goals / Updated goals:    Met DC HEP goal     PLAN  [x]  Upgrade activities as tolerated YES Continue plan of care   []  Discharge due to :    [x]  Other: Plan to DC [place]     Therapist: Julius Wheat PT, DPT, MTC, CMTPT    Date: 3/9/2017 Time: 8:25 AM     Future Appointments  Date Time Provider Jeannine Garcia   3/9/2017 9:00 AM Raphael Mosquera, PT [facility] [place]   3/13/2017 7:00 AM Jagruti Strauss, PT [facility] [place]

## 2017-03-13 ENCOUNTER — HOSPITAL ENCOUNTER (OUTPATIENT)
Dept: PHYSICAL THERAPY | Age: 27
Discharge: HOME | End: 2017-03-13
Payer: COMMERCIAL

## 2017-03-13 PROCEDURE — 97530 THERAPEUTIC ACTIVITIES: CPT

## 2017-03-13 PROCEDURE — 97110 THERAPEUTIC EXERCISES: CPT

## 2017-03-13 NOTE — PROGRESS NOTES
[address]  PHYSICAL THERAPY AT [address] [place] [address]  [address], [address] ,[address], [address]  Phone: [phone]  Fax: [phone] SUMMARY  Patient Name: Daniel Rogers : 1990   Treatment/Medical Diagnosis: Left shoulder pain [M25.512]  Shoulder instability, left [M25.312]   Referral Source: [employer],*     Date of Initial Visit: 17 Attended Visits: 27 Missed Visits: 0     SUMMARY OF TREATMENT  Therapeutic exercise including ROM, stretching, gentle strengthening, scapular stabilization training, postural ed, patient education, HEP instruction, CP, manual therapy therapy including passive stretching. CURRENT STATUS  Mr. Junie Sharma has made good progress with PT & reports intermittent, rare c/o pain in L shoulder. Please see below for functional improvements with ROM & strength. He has been educated on transition back to previous recreational fitness program including light weight training, he is pleased with his progress & feels ready for DC to maintain current level of function. Goal/Measure of Progress Goal Met? 1. Patient to report 50% improvement in overall function in preparation for return to recreational activities with manageable sx in L shoulder. Status at last Eval: NA Current Status: 95% yes   2. Improve L shoulder AROM for flex/scaption to 130 degrees in standing, [email or web address] in supine to 45 deg, [email or web address] level of L4 so ROM is available for dressing activities and/or overhead reaching. Status at last Eval: PROM: flex 160  ABD: 90 deg  [email or web address]: 60 deg Current Status: AROM WFL all planes, [email or web address] deg  [email or web address]@[email or web address] level of mid t/s yes   3. Improve overall strength of L shoulder to 4/5 so strength is available for return to light lifting activities at work/home. Status at last Eval: NA Current Status: Flex/ABD 5-/5  ER/IR 5/5 yes   4.   Improve FOTO score from 59 points to > or = 65 points indicating improved tolerance with ADLs in regards to L shoulder. Status at last Eval: 59 Current Status: 93 yes     RECOMMENDATIONS  Discontinue therapy. Progressing towards or have reached established goals. If you have any questions/comments please contact us directly at [phone]. Thank you for allowing us to assist in the care of your patient.     Therapist Signature: YESENIA Coyle, cert MDT Date: 7-08-02     Time: 6:30 AM

## 2017-03-13 NOTE — PROGRESS NOTES
PHYSICAL THERAPY - DAILY TREATMENT NOTE    Patient Name: Tomasz Conte        Date: 3/13/2017  : 1990   YES Patient  Verified  Visit #:   32   of   27  Insurance: Payor: Allen Raza / Plan: Blake Vigil / Product Type: HMO /      In time: 7:05 A Out time: 8:25 A   Total Treatment Time: 75     Medicare Time Tracking (below)   Total Timed Codes (min):  NA 1:1 Treatment Time:  NA     TREATMENT AREA =  L shoulder    SUBJECTIVE  Pain Level (on 0 to 10 scale):  0  / 10   Medication Changes/New allergies or changes in medical history, any new surgeries or procedures? NO    If yes, update Summary List   Subjective Functional Status/Changes:  []  No changes reported     See DC summary           OBJECTIVE  Modalities Rationale:     decrease pain to improve patient's ability to return to pain-free use of L shoulder with ADLs   min [] Estim, type/location:                                      []  att     []  unatt     []  w/US     []  w/ice    []  w/heat    min []  Mechanical Traction: type/lbs                   []  pro   []  sup   []  int   []  cont    []  before manual    []  after manual    min []  Ultrasound, settings/location:      min []  Iontophoresis w/ dexamethasone, location:                                               []  take home patch       []  in clinic   10 min [x]  Ice     []  Heat    location/position: Supine to L shoulder     min []  Vasopneumatic Device, press/temp:     min []  Other:    [] Skin assessment post-treatment (if applicable):    []  intact    []  redness- no adverse reaction     []redness - adverse reaction:        45 min Therapeutic Exercise:  [x]  See flow sheet   Rationale:      increase ROM and increase strength to improve the patients ability to return to overhead lifting      min Manual Therapy:    Rationale:    Improve ROM is ROM is available for overhead reaching.       20 min Therapeutic Activity: Reviewed progression to fitness program using free weights as well as cable pulley system & bench & CKC stability ex    Rationale:    Improve strength to return to lifting without limitation s     min Neuromuscular Re-ed:    Rationale:       min Gait Training:    Rationale:       min Patient Education:  YES  Reviewed HEP   []  Progressed/Changed HEP based on: Other Objective/Functional Measures:    Pt indep with program     Post Treatment Pain Level (on 0 to 10) scale:   0  / 10     ASSESSMENT  Assessment/Changes in Function:     Good progress with fxl ROM & strength, pt is pleased with hi sprogress & feels ready for DC to HEP     []  See Progress Note/Recertification   Patient will continue to benefit from skilled PT services to instruct in home and community integration to attain remaining goals. Progress toward goals / Updated goals:     All goals met      PLAN  []  Upgrade activities as tolerated NO Continue plan of care   [x]  Discharge due to : Goals met    []  Other:      Therapist: Monica Anderson PT    Date: 3/13/2017 Time: 8:30 AM     Future Appointments  Date Time Provider Jeannine Garcia   3/13/2017 7:00 AM Monica Anderson, PT [facility] [place]

## 2019-09-25 ENCOUNTER — HOSPITAL ENCOUNTER (OUTPATIENT)
Dept: PHYSICAL THERAPY | Age: 29
Discharge: HOME | End: 2019-09-25
Payer: COMMERCIAL

## 2019-09-25 PROCEDURE — 97162 PT EVAL MOD COMPLEX 30 MIN: CPT

## 2019-09-25 PROCEDURE — 97140 MANUAL THERAPY 1/> REGIONS: CPT
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
## 2019-09-25 NOTE — PROGRESS NOTES
PHYSICAL THERAPY - DAILY TREATMENT NOTE    Patient Name: Félix Rodriguez        Date: 2019  : 1990    Patient  Verified: YES  Visit #:      12  Insurance: Payor: Mona Soriano / Plan: [facility] PPO / Product Type: PPO /      In time: 4:45 Out time: 5:30   Total Treatment Time: 45     Medicare Time Tracking (below)   Total Timed Codes (min):  - 1:1 Treatment Time:  -     TREATMENT AREA/ DIAGNOSIS = Neck pain [M54.2]  Low back pain [M54.5]    SUBJECTIVE  Pain Level (on 0 to 10 scale):  5   10   Medication Changes/New allergies or changes in medical history, any new surgeries or procedures? NO    If yes, update Summary List   Subjective Functional Status/Changes:  []  No changes reported     See Eval      OBJECTIVE    10 min Manual Therapy: PA mobs to t/s in prone 3/4. PA mobs to lumbar spine grade 1/2. PROM to C/S. Rationale:      increase ROM and increase tissue extensibility to improve patient's ability to perform ADLs without pain    Billed With/As:   [x] TE   [] TA   [] Neuro   [] Self Care Patient Education: [x] Review HEP    [] Progressed/Changed HEP based on:   [] positioning   [] body mechanics   [] transfers   [] heat/ice application    [] other:        Other Objective/Functional Measures:    See Eval   Post Treatment Pain Level (on 0 to 10) scale:   5  / 10     ASSESSMENT  Assessment/Changes in Function:     See Eval     []  See Progress Note/Recertification   Patient will continue to benefit from skilled PT services to modify and progress therapeutic interventions, address functional mobility deficits, address ROM deficits, address strength deficits, analyze and address soft tissue restrictions and analyze and cue movement patterns to attain remaining goals.    Progress toward goals / Updated goals:    See Eval     PLAN  [x]  Upgrade activities as tolerated YES Continue plan of care   []  Discharge due to :    []  Other:      Therapist: Cha Bansal DPT     Date: 9/25/2019 Time: 5:33 PM        Future Appointments   Date Time Provider Jeannine Radha   10/2/2019  4:30 PM Liz Pathak, PT [facility]   10/8/2019  5:00 PM Kayla Lopez, PTA [facility]   10/10/2019  6:00 PM Kayla Lopez, PTA [facility]   10/16/2019  5:30 PM Ann Hardin, PT [facility]   10/17/2019  6:00 PM Kayla Lopez, PTA [facility]   10/21/2019  5:00 PM Ann Hardin, PT [facility]   10/24/2019  5:00 PM Kayla Lopez, PTA [facility]   10/28/2019  5:00 PM Ann Hardin, PT [facility]   10/30/2019  5:00 PM iLz Pathak, PT [facility]

## 2019-10-02 ENCOUNTER — HOSPITAL ENCOUNTER (OUTPATIENT)
Dept: PHYSICAL THERAPY | Age: 29
Discharge: HOME | End: 2019-10-02
Payer: COMMERCIAL

## 2019-10-02 PROCEDURE — 97110 THERAPEUTIC EXERCISES: CPT

## 2019-10-02 PROCEDURE — 97140 MANUAL THERAPY 1/> REGIONS: CPT

## 2019-10-02 NOTE — PROGRESS NOTES
PHYSICAL THERAPY - DAILY TREATMENT NOTE      Patient Name: Omar Hopper        Date: 10/2/2019  : 1990   YES Patient  Verified  Visit #:     Insurance: Payor: Cristel Bailey / Plan: [facility] PPO / Product Type: PPO /      In time: 4:35 Out time: 5:20   Total Treatment Time: 45     Medicare/[facility] Time Tracking (below)   Total Timed Codes (min):  35 1:1 Treatment Time:  35     TREATMENT AREA =  Neck pain [M54.2]  Low back pain [M54.5]    SUBJECTIVE    Pain Level (on 0 to 10 scale):  5  / 10   Medication Changes/New allergies or changes in medical history, any new surgeries or procedures?     NO    If yes, update Summary List   Subjective Functional Status/Changes:  []  No changes reported       Functional improvements: none reported  Functional impairments: pain with work/ADLs         OBJECTIVE  Modalities Rationale:     decrease pain to improve patient's ability to perform ADLs/work      min [] Estim, type/location:                                      []  att     []  unatt     []  w/US     []  w/ice    []  w/heat    min []  Mechanical Traction: type/lbs                   []  pro   []  sup   []  int   []  cont    []  before manual    []  after manual    min []  Ultrasound, settings/location:      min []  Iontophoresis w/ dexamethasone, location:                                               []  take home patch       []  in clinic   10 min []  Ice     [x]  Heat    location/position: Supine cervical    min []  Vasopneumatic Device, press/temp:     min []  Other:    [x] Skin assessment post-treatment (if applicable):    []  intact    [x]  redness- no adverse reaction     []redness  adverse reaction:      20 min Therapeutic Exercise:  [x]  See flow sheet   Rationale:      increase ROM and increase strength to improve the patients ability to perform ADLs     15 min Manual Therapy: Technique:      [] S/DTM []IASTM []PROM [] Passive Stretching   []manual TPR    []Jt manipulation:Gr I [] II []  III [] IV[] V[]  Treatment Area:  SOR, DTM cervical paraspinals, UT, mid trap   Rationale:      decrease pain, increase ROM and increase tissue extensibility to improve patient's ability to perform ADLs/work    Billed With/As:   [x] TE   [] TA   [] Neuro   [] Self Care Patient Education: [x] Review HEP    [] Progressed/Changed HEP based on:   [] positioning   [] body mechanics   [] transfers   [] heat/ice application    [] other:      Other Objective/Functional Measures:    Patient reported increased pain with therex. Post Treatment Pain Level (on 0 to 10) scale:   5  / 10     ASSESSMENT    Assessment/Changes in Function:     Patient continuing to report pain at work and with ADLs. []  See Progress Note/Recertification   Patient will continue to benefit from skilled PT services to modify and progress therapeutic interventions, address functional mobility deficits, address ROM deficits, address strength deficits, analyze and address soft tissue restrictions, analyze and cue movement patterns, analyze and modify body mechanics/ergonomics and assess and modify postural abnormalities to attain remaining goals. to attain remaining goals. Progress toward goals / Updated goals:    Slow Progress to    [] STG    [x] LTG  3 as shown by pain 5/10.      PLAN    [x]  Upgrade activities as tolerated YES Continue plan of care   []  Discharge due to :    []  Other:      Therapist: Pawan Mcgregor PT    Date: 10/2/2019 Time: 5:17 PM     Future Appointments   Date Time Provider Jeannine Garcia   10/8/2019  5:00 PM Roxanne Hutchins [facility]   10/10/2019  6:00 PM Vasiliy Ohara PTA [facility]   10/16/2019  5:30 PM Jalyn Stephens PT [facility]   10/17/2019  6:00 PM Vasiliy Ohara PTA [facility]   10/21/2019  5:00 PM Jalyn Stephens PT [facility]   10/24/2019  5:00 PM Vasiliy Ohara PTA [facility]   10/28/2019  5:00 PM Jalyn Stephens PT [facility]   10/30/2019  5:00 PM Katie Downing PT [facility]

## 2019-10-08 ENCOUNTER — HOSPITAL ENCOUNTER (OUTPATIENT)
Dept: PHYSICAL THERAPY | Age: 29
Discharge: HOME | End: 2019-10-08
Payer: COMMERCIAL

## 2019-10-08 PROCEDURE — 97110 THERAPEUTIC EXERCISES: CPT

## 2019-10-08 PROCEDURE — 97140 MANUAL THERAPY 1/> REGIONS: CPT

## 2019-10-08 NOTE — PROGRESS NOTES
PHYSICAL THERAPY - DAILY TREATMENT NOTE      Patient Name: Alisha Bullock        Date: 10/8/2019  : 1990   YES Patient  Verified  Visit #:   3   of   12  Insurance: Payor: Yelitza Mejia / Plan: [facility] PPO / Product Type: PPO /      In time: 5:00 Out time: 5:55   Total Treatment Time: 55     Medicare/[facility] Time Tracking (below)   Total Timed Codes (min):  45 1:1 Treatment Time:  45     TREATMENT AREA =  Neck pain [M54.2]  Low back pain [M54.5]    SUBJECTIVE    Pain Level (on 0 to 10 scale):  4  / 10  (ache)   Medication Changes/New allergies or changes in medical history, any new surgeries or procedures? NO    If yes, update Summary List   Subjective Functional Status/Changes:  []  No changes reported     \"No significant change. Working on posture. Pain with bending over and picking things up off floor. \"       OBJECTIVE  Modalities Rationale:     decrease pain to improve patient's ability to perform ADLs/work      min [] Estim, type/location:                                      []  att     []  unatt     []  w/US     []  w/ice    []  w/heat    min []  Mechanical Traction: type/lbs                   []  pro   []  sup   []  int   []  cont    []  before manual    []  after manual    min []  Ultrasound, settings/location:      min []  Iontophoresis w/ dexamethasone, location:                                               []  take home patch       []  in clinic   10 min []  Ice     [x]  Heat    location/position: To C/S and L/S in supine    min []  Vasopneumatic Device, press/temp:     min []  Other:    [x] Skin assessment post-treatment (if applicable):    []  intact    [x]  redness- no adverse reaction     []redness  adverse reaction:      35 min Therapeutic Exercise:  [x]  See flow sheet   Rationale:      increase ROM and increase strength to improve the patients ability to perform ADLs     10 min Manual Therapy: SOR, DTM cervical paraspinals, UT, mid trap   Rationale:      decrease pain, increase ROM and increase tissue extensibility to improve patient's ability to perform ADLs/work    Billed With/As:   [x] TE   [] TA   [] Neuro   [] Self Care Patient Education: [x] Review HEP    [] Progressed/Changed HEP based on:   [] positioning   [] body mechanics   [] transfers   [] heat/ice application    [] other:      Other Objective/Functional Measures:    Tight R UT. Initiated L/S TE with no sx exacerbation. Post Treatment Pain Level (on 0 to 10) scale:   3  / 10 (less achey)     ASSESSMENT    Assessment/Changes in Function:     Functional Limitations: pain with prolonged sitting, light duty @ work  Functional Improvements: None     []  See Progress Note/Recertification   Patient will continue to benefit from skilled PT services to modify and progress therapeutic interventions, address functional mobility deficits, address ROM deficits, address strength deficits, analyze and address soft tissue restrictions, analyze and cue movement patterns, analyze and modify body mechanics/ergonomics and assess and modify postural abnormalities to attain remaining goals. Progress toward goals / Updated goals:    Slow Progress to    [] STG    [x] LTG  3 as shown by pain 5/10.      PLAN    [x]  Upgrade activities as tolerated YES Continue plan of care   []  Discharge due to :    []  Other:      Therapist: Hany Mathis PTA    Date: 10/8/2019 Time: 5:17 PM     Future Appointments   Date Time Provider Jeannine Garcia   10/8/2019  5:00 PM Katy Jenkins [facility] [address]   10/10/2019  6:00 PM Raymon Carreon PTA [facility] [address]   10/16/2019  5:30 PM Brenda Vidales PT [facility] AT [address]   10/17/2019  6:00 PM Raymon Carreon PTA [facility] [address]   10/21/2019  5:00 PM Brenda Vidales PT [facility] AT [address]   10/24/2019  5:00 PM Raymon Carreon PTA [facility] [address]   10/28/2019  5:00 PM Brenda Vidales PT [facility] [address]   10/30/2019  5:00 PM Damian Zaheer, PT [facility] AT [address]

## 2019-10-10 ENCOUNTER — HOSPITAL ENCOUNTER (OUTPATIENT)
Dept: PHYSICAL THERAPY | Age: 29
Discharge: HOME | End: 2019-10-10
Payer: COMMERCIAL

## 2019-10-10 PROCEDURE — 97140 MANUAL THERAPY 1/> REGIONS: CPT

## 2019-10-10 PROCEDURE — 97110 THERAPEUTIC EXERCISES: CPT

## 2019-10-10 NOTE — PROGRESS NOTES
PHYSICAL THERAPY - DAILY TREATMENT NOTE  -    Patient Name: Chela Lozano        Date: 10/10/2019  : 1990   YES Patient  Verified  Visit #:     Insurance: Payor: Michelle Cote / Plan: [facility] PPO / Product Type: PPO /      In time: 6:00 Out time: 6:55   Total Treatment Time: 55     Medicare/[employer] Time Tracking (below)   Total Timed Codes (min):  45 1:1 Treatment Time:  45     TREATMENT AREA =  Neck pain [M54.2]  Low back pain [M54.5]    SUBJECTIVE    Pain Level (on 0 to 10 scale):  5  / 10     Medication Changes/New allergies or changes in medical history, any new surgeries or procedures? NO    If yes, update Summary List   Subjective Functional Status/Changes:  []  No changes reported     \"No change in overall pain levels but sore in my hips/core from the new TE.\"       OBJECTIVE  Modalities Rationale:     decrease pain to improve patient's ability to perform ADLs/work      min [] Estim, type/location:                                      []  att     []  unatt     []  w/US     []  w/ice    []  w/heat    min []  Mechanical Traction: type/lbs                   []  pro   []  sup   []  int   []  cont    []  before manual    []  after manual    min []  Ultrasound, settings/location:      min []  Iontophoresis w/ dexamethasone, location:                                               []  take home patch       []  in clinic   10 min []  Ice     [x]  Heat    location/position: To C/S and L/S in supine    min []  Vasopneumatic Device, press/temp:     min []  Other:    [x] Skin assessment post-treatment (if applicable):    [x]  intact    [x]  redness- no adverse reaction     []redness  adverse reaction:      30 min Therapeutic Exercise:  [x]  See flow sheet   Rationale:      increase ROM and increase strength to improve the patients ability to perform ADLs     15 min Manual Therapy: MET to correct L ant innom. DTM to C/S f/b UT/LS stretching.     Rationale:      decrease pain, increase ROM and increase tissue extensibility to improve patient's ability to perform ADLs/work    Billed With/As:   [x] TE   [] TA   [] Neuro   [] Self Care Patient Education: [x] Review HEP    [] Progressed/Changed HEP based on:   [x] positioning   [] body mechanics   [] transfers   [x] heat/ice application    [x] other: Use of pillow between knees for SL sleep. Other Objective/Functional Measures:    Tight R UT.   MET to correct L ant innom. Post Treatment Pain Level (on 0 to 10) scale:   3  / 10      ASSESSMENT    Assessment/Changes in Function:     Functional Limitations: Pain with prolonged sitting, light duty @ work  Functional Improvements: None reported     []  See Progress Note/Recertification   Patient will continue to benefit from skilled PT services to modify and progress therapeutic interventions, address functional mobility deficits, address ROM deficits, address strength deficits, analyze and address soft tissue restrictions, analyze and cue movement patterns, analyze and modify body mechanics/ergonomics and assess and modify postural abnormalities to attain remaining goals. Progress toward goals / Updated goals:    Slow Progress to    [x] STG   #2  [x] LTG #3. Cont to report 5/10 pain.       PLAN    [x]  Upgrade activities as tolerated YES Continue plan of care   []  Discharge due to :    []  Other:      Therapist: Kolton Roberson PTA    Date: 10/10/2019 Time: 5:17 PM     Future Appointments   Date Time Provider Jeannine Garcia   10/10/2019  6:00 PM Esme Solano [facility]   10/16/2019  5:30 PM Jacek Collier PT [facility]   10/17/2019  6:00 PM Maria A Ramachandran PTA [facility]   10/21/2019  5:00 PM Jacek Collier PT [facility]   10/24/2019  5:00 PM Maria A Ramachandran PTA [facility]   10/28/2019  5:00 PM Jacek Collier PT [facility]   10/30/2019  5:00 PM Kenneth Orta, PT [facility]

## 2019-10-16 ENCOUNTER — HOSPITAL ENCOUNTER (OUTPATIENT)
Dept: PHYSICAL THERAPY | Age: 29
Discharge: HOME | End: 2019-10-16
Payer: COMMERCIAL

## 2019-10-16 PROCEDURE — 97140 MANUAL THERAPY 1/> REGIONS: CPT

## 2019-10-16 PROCEDURE — 97110 THERAPEUTIC EXERCISES: CPT

## 2019-10-16 NOTE — PROGRESS NOTES
PHYSICAL THERAPY - DAILY TREATMENT NOTE      Patient Name: Anival Castro        Date: 10/16/2019  : 1990   YES Patient  Verified  Visit #:   Insurance: Payor: Pamela Score / Plan: [facility] PPO / Product Type: PPO /      In time: 545 Out time: 6:40   Total Treatment Time: 55     Medicare/[facility] Time Tracking (below)   Total Timed Codes (min):  45 1:1 Treatment Time:  45     TREATMENT AREA =  Neck pain [M54.2]  Low back pain [M54.5]    SUBJECTIVE    Pain Level (on 0 to 10 scale): 4 / 10     Medication Changes/New allergies or changes in medical history, any new surgeries or procedures? NO    If yes, update Summary List   Subjective Functional Status/Changes:  []  No changes reported     Neck radiating into lower part of neck and up to head. Pt with relief temporary relief after tx.      OBJECTIVE  Modalities Rationale:     decrease pain to improve patient's ability to perform ADLs/work      min [] Estim, type/location:                                      []  att     []  unatt     []  w/US     []  w/ice    []  w/heat    min []  Mechanical Traction: type/lbs                   []  pro   []  sup   []  int   []  cont    []  before manual    []  after manual    min []  Ultrasound, settings/location:      min []  Iontophoresis w/ dexamethasone, location:                                               []  take home patch       []  in clinic   10 min []  Ice     [x]  Heat    location/position: To C/S and L/S in supine    min []  Vasopneumatic Device, press/temp:     min []  Other:    [x] Skin assessment post-treatment (if applicable):    [x]  intact    [x]  redness- no adverse reaction     []redness  adverse reaction:      30 min Therapeutic Exercise:  [x]  See flow sheet   Rationale:      increase ROM and increase strength to improve the patients ability to perform ADLs, work    15 min Manual Therapy: Sacral rotation L correction  DTM to cervical, prone T/S joint mobility Rationale:      decrease pain, increase ROM and increase tissue extensibility to improve patient's ability to perform ADLs, work    Billed With/As:   [x] TE   [] TA   [] Neuro   [] Self Care Patient Education: [x] Review HEP    [] Progressed/Changed HEP based on:   [x] positioning   [] body mechanics   [] transfers   [x] heat/ice application     [] other:       Other Objective/Functional Measures:    TTP t/o lumbar, thoracic and cervical paraspinals. Post Treatment Pain Level (on 0 to 10) scale:   3  / 10      ASSESSMENT    Assessment/Changes in Function:   Pt challenged with clams and addition of S/L ER.   []  See Progress Note/Recertification   Patient will continue to benefit from skilled PT services to modify and progress therapeutic interventions, address functional mobility deficits, address ROM deficits, address strength deficits, analyze and address soft tissue restrictions, analyze and cue movement patterns, analyze and modify body mechanics/ergonomics and assess and modify postural abnormalities to attain remaining goals.    Progress toward goals / Updated goals:    Slow Progress to    [x] STG   #2  [x] LTG #3, fair progress with 4/10 pain this pm.      PLAN    [x]  Upgrade activities as tolerated YES Continue plan of care   []  Discharge due to :    []  Other:      Therapist: Timothy Small PT    Date: 10/16/2019 Time: 5:45 PM     Future Appointments   Date Time Provider Jeannine Garcia   10/17/2019  6:00 PM Jolene Montanos [facility]   10/21/2019  5:00 PM Jd Stewart, PT [facility]   10/24/2019  5:00 PM Marilee Ochoa PTA [facility]   10/28/2019  5:00 PM Jd Stewart PT [facility]   10/30/2019  5:00 PM Nate Granger PT [facility]

## 2019-10-17 ENCOUNTER — HOSPITAL ENCOUNTER (OUTPATIENT)
Dept: PHYSICAL THERAPY | Age: 29
Discharge: HOME | End: 2019-10-17
Payer: COMMERCIAL

## 2019-10-17 PROCEDURE — 97110 THERAPEUTIC EXERCISES: CPT

## 2019-10-17 PROCEDURE — 97140 MANUAL THERAPY 1/> REGIONS: CPT

## 2019-10-17 NOTE — PROGRESS NOTES
NAVIGNAREN Clinician Contact & Progress Note  For Individual Resiliency Training (IRT)  A Part of the [facility] First Episode of Psychosis Program    NAVIGATE Enrollee: Guero Carter (1992)     MRN: [id number]  Date:  8/17/17  Diagnosis: Schizophrenia, unspecified type; F20.9  Clinician: LIANET Individual Resiliency Trainer, KHRIS Terrell     1. Type of contact: (majority of time spent)  IRT Session    2. People present:   Client  NAVIGATE IRT/writer  Other: n/a    3. Total number of persons who participated in contact: 2    4. Length of Actual Contact: 50 minutes, Record Minutes Travel Time: 0 minutes    [facility]     6. Did the client complete the home practice option(s) from the previous session: NA     7. Motivational Teaching Strategies:  Connect info and skills with personal goals  Promote hope and positive expectations  Explore pros and cons of change  Re-frame experiences in positive light    8. Educational Teaching Strategies:  Review of written material/education  Relate information to client's experience  Ask questions to check comprehension  Break down information into small chunks  Adopt client's language    9. CBT Teaching Strategies:  Reinforcement and shaping (positive feedback for steps towards goals, gains in knowledge & skills, follow-through on home assignments)    Social skills training (rationale for skill, modeling, role play practice, feedback, plan home practice)    Relapse prevention planning (review of stressors, early warning signs, written plan to respond to signs, rehearse plan)    Coping skills training (review current coping skills, increase currently used skills, model new skill, role play new skill, feedback, plan home practice)    Relaxation training (model relaxation technique, practice technique, feedback, plan home practice)    Cognitive restructuring (identify thoughts related to negative feelings, examine the evidence,  PHYSICAL THERAPY - DAILY TREATMENT NOTE  8-    Patient Name: Chong Sneed        Date: 10/17/2019  : 1990   YES Patient  Verified  Visit #:   Insurance: Payor: Clemente Goodpasture / Plan: [facility] PPO / Product Type: PPO /      In time: 6:00 Out time: 7:10   Total Treatment Time: 70     Medicare/[facility] Time Tracking (below)   Total Timed Codes (min):  60 1:1 Treatment Time:  60     TREATMENT AREA =  Neck pain [M54.2]  Low back pain [M54.5]    SUBJECTIVE    Pain Level (on 0 to 10 scale): 4/ 10  C/S  5/10 (L/S)   Medication Changes/New allergies or changes in medical history, any new surgeries or procedures? NO    If yes, update Summary List   Subjective Functional Status/Changes:  []  No changes reported     \"I feel better when I leave PT but within 3 hours the pain starts to come back. \"       OBJECTIVE  Modalities Rationale:     decrease pain to improve patient's ability to perform ADLs/work      min [] Estim, type/location:                                      []  att     []  unatt     []  w/US     []  w/ice    []  w/heat    min []  Mechanical Traction: type/lbs                   []  pro   []  sup   []  int   []  cont    []  before manual    []  after manual    min []  Ultrasound, settings/location:      min []  Iontophoresis w/ dexamethasone, location:                                               []  take home patch       []  in clinic   10 min []  Ice     [x]  Heat    location/position: To C/S and L/S in supine    min []  Vasopneumatic Device, press/temp:     min []  Other:    [x] Skin assessment post-treatment (if applicable):    [x]  intact    [x]  redness- no adverse reaction     []redness  adverse reaction:      45 min Therapeutic Exercise:  [x]  See flow sheet   Rationale:      increase ROM and increase strength to improve the patients ability to perform ADLs, work    15 min Manual Therapy: DTM to C/S and L/S.  T/S joint mobility    Rationale:      decrease pain, "change thought or form action plan)    Behavioral tailoring (fit taking medication into client's daily routine)    10. IRT Module(s) Addressed:  Module 2 - Assessment/Initial Goal Setting    11. Techniques utilized:   [place] announced at beginning of session  Review of goal  Review of previous meeting  Present new material  Help client choose a home practice option  Summarize progress made in current session  12. Mental Status Exam:    Alertness: alert  and oriented  Appearance: well groomed  Behavior/Demeanor: pleasant, calm and guarded, with fair  eye contact   Speech: normal and regular rate and rhythm  Language: intact and no obvious problem. Preferred language identified as English.  Psychomotor: normal or unremarkable  Mood: anxious and worried  Affect: flat; was congruent to mood; was congruent to content  Thought Process/Associations: unremarkable  Thought Content:  Reports none;  Denies suicidal ideation, violent ideation and paranoid ideation  Perception:  Reports none;  Denies auditory hallucinations and visual hallucinations  Insight: fair  Judgment: adequate for safety  Cognition: does  appear grossly intact; formal cognitive testing was not done  Suicidal ideation: denies SI, denies intent,  and denies plan  Homicidal Ideation: denies    13. Assessment/Progress Note:     Writer met client for IRT session at the [facility]. 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 He reported he likes helping out and she pays him. He said he has two weeks left of day treatment and is feeling ready to move on and focus on working full-time. Client reviewed his meeting with HUMAIRA MARTINI Craig Chapman. 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 Client reported his mood has been good, he confirmed taking medications daily. " increase ROM and increase tissue extensibility to improve patient's ability to perform ADLs, work    Billed With/As:   [x] TE   [] TA   [] Neuro   [] Self Care Patient Education: [x] Review HEP    [] Progressed/Changed HEP based on:   [x] positioning   [] body mechanics   [] transfers   [x] heat/ice application     [] other:       Other Objective/Functional Measures:    TTP t/o lumbar, thoracic and cervical paraspinals. Post Treatment Pain Level (on 0 to 10) scale:   0  / 10      ASSESSMENT    Assessment/Changes in Function:     Cont pain with bed mobility and transfers. PT providing temporary relief. []  See Progress Note/Recertification   Patient will continue to benefit from skilled PT services to modify and progress therapeutic interventions, address functional mobility deficits, address ROM deficits, address strength deficits, analyze and address soft tissue restrictions, analyze and cue movement patterns, analyze and modify body mechanics/ergonomics and assess and modify postural abnormalities to attain remaining goals.    Progress toward goals / Updated goals:    Slow Progress to    [x] STG   #2  [x] LTG #3, fair progress with 4/10 pain this pm     PLAN    [x]  Upgrade activities as tolerated YES Continue plan of care   []  Discharge due to :    []  Other:      Therapist: Sachin Pacheco PTA    Date: 10/17/2019 Time: 5:45 PM     Future Appointments   Date Time Provider Jeannine Garcia   10/17/2019  6:00 PM Krishna Gupta [facility]   10/21/2019  5:00 PM Ketty Seth [facility]   10/24/2019  5:00 PM Fina Garcia PTA [facility]   10/28/2019  5:00 PM Iris Kyle PT [facility]   10/30/2019  5:00 PM China Brock, PT [facility] "He reported meeting with LOTTIE Knight CNP last week and that his olanzapine dose was reduced, which he is liking as he is feeling more awake. He denied SI, HI, and substance use. Client reported feeling some anxiety about getting a job, but that he's able to stay positive and hopeful. He reported he had originally hoped to have a job lined up by the time he's done with treatment. Writer reminded client that he has support with SEE and that he is taking steps toward finding the right job.     The rest of the session was spent on Module 1. Discussed recovery and resiliency. Client reported his definition of recovery is \"getting back to good health.\" Client was engaged in conversation about resiliency. He reported resilient qualities of flexibility, sense of purpose, and problem-solving. He said both his parents have shown resiliency. Client was somewhat vague and broad in his descriptions and explanations but elaborated with prompting. Client reported he has been resilient in getting through psychosis. He explained that he used to think people were out to get him but denied current concerns. Client consistently returned to the theme of his goal of getting a job and spoke about resiliency in terms of finding a job. Completed strengths assessment with client. Client indicated \"10\" for a majority of the strengths so writer and client took time to discuss each in more detail. Client ended up selecting critical thinking, persistence, kindness/generosity, team work, forgiveness, and hope and optimism as his top strengths.     Client reviewed and signed preliminary treatment plan. He declined interest in research opportunities at this time.     14. Plan/Referrals:     Client will meet writer in 2 weeks. Client will work to notice how he uses his strengths in daily life as home practice.     [facility]    NAVIGATE Individual Resiliency Trainer    Attestation:    I did not see this patient directly. This patient " is discussed with me in individual clinical social work supervision, and I agree with the plan as documented.     ADELFO Hodges, [facility], August 18, 2017

## 2019-10-21 ENCOUNTER — HOSPITAL ENCOUNTER (OUTPATIENT)
Dept: PHYSICAL THERAPY | Age: 29
Discharge: HOME | End: 2019-10-21
Payer: COMMERCIAL

## 2019-10-21 PROCEDURE — 97140 MANUAL THERAPY 1/> REGIONS: CPT

## 2019-10-21 PROCEDURE — 97110 THERAPEUTIC EXERCISES: CPT

## 2019-10-21 NOTE — PROGRESS NOTES
PHYSICAL THERAPY - DAILY TREATMENT NOTE    Patient Name: Cristal Ashton        Date: 10/21/2019  : 1990    Patient  Verified: YES  Visit #:     Insurance: Payor: Trang Rollins / Plan: [facility] PPO / Product Type: PPO /      In time: 5:05 Out time: 6:00   Total Treatment Time: 55     Medicare Time Tracking (below)   Total Timed Codes (min):  - 1:1 Treatment Time:  -     TREATMENT AREA/ DIAGNOSIS = Neck pain [M54.2]  Low back pain [M54.5]    SUBJECTIVE  Pain Level (on 0 to 10 scale):  4  / 10   Medication Changes/New allergies or changes in medical history, any new surgeries or procedures? NO    If yes, update Summary List   Subjective Functional Status/Changes:  []  No changes reported     Pt reports that the neck is giving him the most trouble with looking side to side. OBJECTIVE  Modalities Rationale:     decrease inflammation and decrease pain to improve patient's ability to perform ADLs without pain     min [] Estim, type/location:                                      []  att     []  unatt     []  w/US     []  w/ice    []  w/heat    min []  Mechanical Traction: type/lbs                   []  pro   []  sup   []  int   []  cont    []  before manual    []  after manual    min []  Ultrasound, settings/location:      min []  Iontophoresis w/ dexamethasone, location:                                               []  take home patch       []  in clinic   10 min []  Ice     [x]  Heat    location/position: L/S c/s    min []  Vasopneumatic Device, press/temp:     min []  Other:    [] Skin assessment post-treatment (if applicable):    []  intact    []  redness- no adverse reaction     []redness  adverse reaction:        35 min Therapeutic Exercise:  [x]  See flow sheet   Rationale:      increase strength and improve coordination to improve the patients ability to perform ADLs without pain       10 min Manual Therapy: STM to c/s.  Prone pa mobs of T/S   Rationale:      increase ROM and increase tissue extensibility to improve patient's ability to perform ADLs without pain      Billed With/As:   [x] TE   [] TA   [] Neuro   [] Self Care Patient Education: [x] Review HEP    [] Progressed/Changed HEP based on:   [] positioning   [] body mechanics   [] transfers   [] heat/ice application    [] other:        Other Objective/Functional Measures:    Pt had no increase in pain during treatment session  Pt has c/s rotation that is [facility]   Post Treatment Pain Level (on 0 to 10) scale:   4  / 10     ASSESSMENT  Assessment/Changes in Function:     Pt required cueing for proper mechanics with exercises,      []  See Progress Note/Recertification   Patient will continue to benefit from skilled PT services to modify and progress therapeutic interventions, address functional mobility deficits, address ROM deficits, address strength deficits and analyze and address soft tissue restrictions to attain remaining goals.    Progress toward goals / Updated goals:    Good Progress to    [] STG    [x] LTG  4 as shown by self reported improvement in overall function     PLAN  [x]  Upgrade activities as tolerated YES Continue plan of care   []  Discharge due to :    []  Other:      Therapist: Antonio Coronado DPT     Date: 10/21/2019 Time: 5:46 PM        Future Appointments   Date Time Provider Jeannine Garcia   10/24/2019  5:00 PM Janette Abrams [facility]   10/28/2019  5:00 PM Jazmin Salazar, PT [facility]   10/30/2019  5:00 PM Kelvin Urrutia, PT [facility]

## 2019-10-24 ENCOUNTER — HOSPITAL ENCOUNTER (OUTPATIENT)
Dept: PHYSICAL THERAPY | Age: 29
Discharge: HOME | End: 2019-10-24
Payer: COMMERCIAL

## 2019-10-24 PROCEDURE — 97530 THERAPEUTIC ACTIVITIES: CPT

## 2019-10-24 PROCEDURE — 97110 THERAPEUTIC EXERCISES: CPT

## 2019-10-24 PROCEDURE — 97140 MANUAL THERAPY 1/> REGIONS: CPT

## 2019-10-24 NOTE — PROGRESS NOTES
PHYSICAL THERAPY - DAILY TREATMENT NOTE    Patient Name: Yang Rater        Date: 10/24/2019  : 1990    Patient  Verified: YES  Visit #:     Insurance: Payor: Kirt Lorenzo / Plan: [facility] PPO / Product Type: PPO /      In time: 5 Out time: 6:10   Total Treatment Time: 70     Medicare/[employer] Time Tracking (below)   Total Timed Codes (min):  60 1:1 Treatment Time:  40     TREATMENT AREA/ DIAGNOSIS = Neck pain [M54.2]  Low back pain [M54.5]    SUBJECTIVE  Pain Level (on 0 to 10 scale):  4  / 10   Medication Changes/New allergies or changes in medical history, any new surgeries or procedures?     NO    If yes, update Summary List   Subjective Functional Status/Changes:  []  No changes reported     Functional improvement im a little better   Functional limitation it hurts to bend over        OBJECTIVE  Modalities Rationale:     decrease pain to improve patient's ability to perform ADLs without pain   min [] Estim, type/location:                                      []  att     []  unatt     []  w/US     []  w/ice    []  w/heat    min []  Mechanical Traction: type/lbs                   []  pro   []  sup   []  int   []  cont    []  before manual    []  after manual    min []  Ultrasound, settings/location:      min []  Iontophoresis w/ dexamethasone, location:                                               []  take home patch       []  in clinic   10 min []  Ice     [x]  Heat    location/position:     min []  Vasopneumatic Device, press/temp:     min []  Other:    [x] Skin assessment post-treatment (if applicable):    [x]  intact    []  redness- no adverse reaction     []redness  adverse reaction:        15 min Manual Therapy: DTM to L/S, GD IV PA mobs to T/S, DTM to C/S, GD IV PA mobs to C/S, B trap stretch, passive sustained rotation   Rationale:      decrease pain, increase ROM and increase tissue extensibility to improve patient's ability to perform ADLs without pain    35 min Therapeutic Exercise:  [x]  See flow sheet   Rationale:      increase ROM and increase strength to improve the patients ability to perform ADLs without pain    10 min Therapeutic Activity: reassessment       Billed With/As:   [x] TE   [] TA   [] Neuro   [] Self Care Patient Education: [x] Review HEP    [] Progressed/Changed HEP based on:   [] positioning   [] body mechanics   [] transfers   [] heat/ice application    [] other:        Other Objective/Functional Measures:    See PN  L L/S paraspinal pain with R lat flex and central L/S LBP with standing flexion  and supine extension, no hinging noted  R neck pain with L rotation   Post Treatment Pain Level (on 0 to 10) scale:   2  / 10     ASSESSMENT  Assessment/Changes in Function:     See PN     [x]  See Progress Note/Recertification   Patient will continue to benefit from skilled PT services to modify and progress therapeutic interventions, address functional mobility deficits, address ROM deficits, address strength deficits, analyze and address soft tissue restrictions, analyze and cue movement patterns, analyze and modify body mechanics/ergonomics and assess and modify postural abnormalities to attain remaining goals.    Progress toward goals / Updated goals:    See PN     PLAN  [x]  Upgrade activities as tolerated YES Continue plan of care   []  Discharge due to :    []  Other:      Therapist: Bossman Butterfield PT    Date: 10/24/2019 Time: 6:38 PM     Future Appointments   Date Time Provider Jeannine Garcia   10/28/2019  5:00 PM Deanna Leigh, PT [facility]   10/30/2019  5:00 PM Mariza Mcgowan, PT [facility]

## 2019-10-24 NOTE — PROGRESS NOTES
[address]  [facility] AT [address] [facility], [address] [place] [address], [address], [address] [place] [address]  Phone: [phone]  Fax: [phone]  PROGRESS NOTE  Patient Name: Kristine Denis : 1990   Treatment/Medical Diagnosis: Neck pain [M54.2]  Low back pain [M54.5]   Referral Source: Jeff Rahman MD     Date of Initial Visit: 19 Attended Visits: 8 Missed Visits: 0     SUMMARY OF TREATMENT  Manual therapy, including joint mobs and massage to C/S and L/S. Therex for core stability and correct posture. Patient education on posture/body mechanics and HEP. CURRENT STATUS  The patient has improved a little, still with LBP and neck pain, but no radic symptoms. pain appears to be non mechanical and soft tissue related. Goal/Measure of Progress Goal Met? 1. Increase FOTO score to >=60, to indicate increased function    Status at last Eval: 46 Current Status: NT progressing   2. Full C/S AROM for all planes to allow safe driving   Status at last Eval: Flex = 35, Ext = 45, L/R lat flex = 12/20, L/R rotation = 34/38 Current Status: Full cervical AROM yes   3. Pt with < = 2/10 pain    Status at last Eval: Pain up to 6/10 Current Status: Pain up to 5/10 Progressing slowly   4. Pt will demonstrate a GROC score of >=+5, to indicate increased function   Status at last Eval: - Current Status: +2, a little better progressing     New Goals to be achieved in __4-5__  weeks:  1. Pt will report GROC of >=+5, to indicate increased function   2. Increase FOTO score to >=60, to indicate increased function   3. Pt with < = 2/10 pain with ADLs. 4.  Pain free L/S AROM       RECOMMENDATIONS  Continue PT 2x/week x 4-5 weeks   If you have any questions/comments please contact us directly at [phone]. Thank you for allowing us to assist in the care of your patient. Therapist Signature:  Agustina Starks, PT Date: 10/24/2019     Time: 3:11 PM   NOTE TO PHYSICIAN:  PLEASE COMPLETE THE ORDERS BELOW AND FAX TO   [facility] [place]: [phone]. If you are unable to process this request in 24 hours please contact our office: [phone].    ___ I have read the above report and request that my patient continue as recommended.   ___ I have read the above report and request that my patient continue therapy with the following changes/special instructions:_________________________________________________   ___ I have read the above report and request that my patient be discharged from therapy.      Physician Signature:                   Date:       Time:

## 2019-10-28 ENCOUNTER — HOSPITAL ENCOUNTER (OUTPATIENT)
Dept: PHYSICAL THERAPY | Age: 29
Discharge: HOME | End: 2019-10-28
Payer: COMMERCIAL

## 2019-10-28 PROCEDURE — 97110 THERAPEUTIC EXERCISES: CPT

## 2019-10-28 PROCEDURE — 97140 MANUAL THERAPY 1/> REGIONS: CPT

## 2019-10-28 NOTE — PROGRESS NOTES
PHYSICAL THERAPY - DAILY TREATMENT NOTE      Patient Name: Quentin Scott        Date: 10/28/2019  : 1990   YES Patient  Verified  Visit #:   Insurance: Payor: Michelle Rai / Plan: [facility] PPO / Product Type: PPO /      In time: 5 Out time: 6   Total Treatment Time: 60     Medicare/[employer] Time Tracking (below)   Total Timed Codes (min):  50 1:1 Treatment Time: 30     TREATMENT AREA =  Neck pain [M54.2]  Low back pain [M54.5]    SUBJECTIVE    Pain Level (on 0 to 10 scale): 4 / 10     Medication Changes/New allergies or changes in medical history, any new surgeries or procedures? NO    If yes, update Summary List   Subjective Functional Status/Changes:  []  No changes reported   Pt reporting overall symptoms have improved. Notes PT visits have helped improve pain level and ability to move thru normal motions during ADLs. Continues to be limited at work by pain level.        OBJECTIVE  Modalities Rationale:     decrease pain to improve patient's ability to perform ADLs/work      min [] Estim, type/location:                                      []  att     []  unatt     []  w/US     []  w/ice    []  w/heat    min []  Mechanical Traction: type/lbs                   []  pro   []  sup   []  int   []  cont    []  before manual    []  after manual    min []  Ultrasound, settings/location:      min []  Iontophoresis w/ dexamethasone, location:                                               []  take home patch       []  in clinic   10 min []  Ice     [x]  Heat    location/position: To C/S and L/S in supine    min []  Vasopneumatic Device, press/temp:     min []  Other:    [x] Skin assessment post-treatment (if applicable):    [x]  intact    [x]  redness- no adverse reaction     []redness  adverse reaction:      35/15 min Therapeutic Exercise:  [x]  See flow sheet   Rationale:      increase ROM and increase strength   to improve the patients ability to perform ADLs, work with less pain    15 min Manual Therapy: Cervical: STM B UT f/b manual stretching, prone T/S joint mobility    Rationale:      decrease pain, increase ROM and increase tissue extensibility   to improve patient's ability to perform ADLs, work with less pain    Billed With/As:   [x] TE   [] TA   [] Neuro   [] Self Care Patient Education: [x] Review HEP    [] Progressed/Changed HEP based on:   [x] positioning   [] body mechanics   [] transfers   [x] heat/ice application     [] other:       Other Objective/Functional Measures: Added TB with clams, QP LE slide. Attempted LE lift, unable to perform. Post Treatment Pain Level (on 0 to 10) scale:   3  / 10      ASSESSMENT    Assessment/Changes in Function:   Pt challenged with QPed LE slide and clams with TB. His mobility has improved gradually for lumbar and cervical motion. []  See Progress Note/Recertification   Patient will continue to benefit from skilled PT services to modify and progress therapeutic interventions, address functional mobility deficits, address ROM deficits, address strength deficits, analyze and address soft tissue restrictions, analyze and cue movement patterns, analyze and modify body mechanics/ergonomics and assess and modify postural abnormalities to attain remaining goals.    Progress toward goals / Updated goals:    Slow Progress to    [x] STG   #2  [x] LTG #3, fair progress with 3/10 pain this pm.      PLAN    [x]  Upgrade activities as tolerated YES Continue plan of care   []  Discharge due to :    []  Other:      Therapist: Arie Giraldo, PT    Date: 10/28/2019 Time: 6 PM     Future Appointments   Date Time Provider Jeannine Garcia   10/28/2019  5:00 PM Jazmin Salazar, PT [facility]   10/30/2019  5:00 PM Kelvin Urrutia, PT [facility] AT [facility]

## 2019-10-30 ENCOUNTER — HOSPITAL ENCOUNTER (OUTPATIENT)
Dept: PHYSICAL THERAPY | Age: 29
Discharge: HOME | End: 2019-10-30
Payer: COMMERCIAL

## 2019-10-30 ENCOUNTER — APPOINTMENT (OUTPATIENT)
Dept: PHYSICAL THERAPY | Age: 29
End: 2019-10-30
Payer: COMMERCIAL

## 2019-10-30 PROCEDURE — 97110 THERAPEUTIC EXERCISES: CPT

## 2019-10-30 PROCEDURE — 97140 MANUAL THERAPY 1/> REGIONS: CPT

## 2019-10-30 NOTE — PROGRESS NOTES
PHYSICAL THERAPY - DAILY TREATMENT NOTE      Patient Name: Kristine Denis        Date: 10/30/2019  : 1990   YES Patient  Verified  Visit #:   10   of   12  Insurance: Payor: Junaid Jonas / Plan: [facility] PPO / Product Type: PPO /      In time: 5:05 Out time: 5:55   Total Treatment Time: 50     Medicare/[facility] Time Tracking (below)   Total Timed Codes (min):  40 1:1 Treatment Time:  40     TREATMENT AREA =  Neck pain [M54.2]  Low back pain [M54.5]    SUBJECTIVE    Pain Level (on 0 to 10 scale):  4  / 10   Medication Changes/New allergies or changes in medical history, any new surgeries or procedures?     NO    If yes, update Summary List   Subjective Functional Status/Changes:  []  No changes reported       Functional improvements: improved ROM with work/ADLs  Functional impairments: pain with work/ADLs         OBJECTIVE  Modalities Rationale:     decrease pain to improve patient's ability to perform work/ADLs      min [] Estim, type/location:                                      []  att     []  unatt     []  w/US     []  w/ice    []  w/heat    min []  Mechanical Traction: type/lbs                   []  pro   []  sup   []  int   []  cont    []  before manual    []  after manual    min []  Ultrasound, settings/location:      min []  Iontophoresis w/ dexamethasone, location:                                               []  take home patch       []  in clinic   10 min []  Ice     [x]  Heat    location/position: Cervical and thoracic supine    min []  Vasopneumatic Device, press/temp:     min []  Other:    [x] Skin assessment post-treatment (if applicable):    []  intact    [x]  redness- no adverse reaction     []redness  adverse reaction:      30 min Therapeutic Exercise:  [x]  See flow sheet   Rationale:      increase ROM and increase strength to improve the patients ability to perform work/ADLs     10 min Manual Therapy: Technique:      [] S/DTM []IASTM []PROM [] Passive Stretching []manual TPR    []Jt manipulation:Gr I [] II []  III [] IV[] V[]  Treatment Area:  SOR, DTM cervical paraspinals, UT   Rationale:      decrease pain, increase ROM and increase tissue extensibility to improve patient's ability to perform work/ADLs    Billed With/As:   [x] TE   [] TA   [] Neuro   [] Self Care Patient Education: [x] Review HEP    [] Progressed/Changed HEP based on:   [] positioning   [] body mechanics   [] transfers   [] heat/ice application    [] other:      Other Objective/Functional Measures:    No complaints of increased pain with therex progression. Post Treatment Pain Level (on 0 to 10) scale:   0  / 10     ASSESSMENT    Assessment/Changes in Function:     Patient reporting improved ROM with daily activities. []  See Progress Note/Recertification   Patient will continue to benefit from skilled PT services to modify and progress therapeutic interventions, address functional mobility deficits, address ROM deficits, address strength deficits, analyze and address soft tissue restrictions, analyze and cue movement patterns, analyze and modify body mechanics/ergonomics and assess and modify postural abnormalities to attain remaining goals. to attain remaining goals. Progress toward goals / Updated goals:    Fair Progress to    [] STG    [x] LTG  3 as shown by pain 4/10.      PLAN    [x]  Upgrade activities as tolerated YES Continue plan of care   []  Discharge due to :    []  Other:      Therapist: Lolita Saleh PT    Date: 10/30/2019 Time: 5:02 PM     Future Appointments   Date Time Provider Jeannine Garcia   11/5/2019  5:00 PM Mary Mancilla [facility]   11/7/2019  5:30 PM Rivka Carrel, PTA [facility]   11/12/2019  5:00 PM Rivka Carrel, PTA [facility]   11/14/2019  5:30 PM Alberto Kern PT [facility]   11/18/2019  5:00 PM Darron Chu PT [facility]   11/20/2019  5:00 PM Darron Chu PT [facility]   11/26/2019  5:00 PM Rivka Carrel, PTA [facility]   11/27/2019 5:00 PM Arlen Bergman, PT [facility]

## 2019-11-05 ENCOUNTER — HOSPITAL ENCOUNTER (OUTPATIENT)
Dept: PHYSICAL THERAPY | Age: 29
Discharge: HOME | End: 2019-11-05
Payer: COMMERCIAL

## 2019-11-05 PROCEDURE — 97140 MANUAL THERAPY 1/> REGIONS: CPT

## 2019-11-05 PROCEDURE — 97110 THERAPEUTIC EXERCISES: CPT

## 2019-11-05 NOTE — PROGRESS NOTES
PHYSICAL THERAPY - DAILY TREATMENT NOTE    Patient Name: Shabbir Arshad        Date: 2019  : 1990    Patient  Verified: YES  Visit #:     Insurance: Payor: Kali Melendez / Plan: [facility] PPO / Product Type: PPO /      In time: 5 Out time: 5:55   Total Treatment Time: 55     Medicare/[employer] Time Tracking (below)   Total Timed Codes (min):  45 1:1 Treatment Time:  30     TREATMENT AREA/ DIAGNOSIS = Neck pain [M54.2]  Low back pain [M54.5]    SUBJECTIVE  Pain Level (on 0 to 10 scale):  3  / 10   Medication Changes/New allergies or changes in medical history, any new surgeries or procedures? NO    If yes, update Summary List   Subjective Functional Status/Changes:  []  No changes reported     Functional improvement im having less pain with ADLs   Functional limitation LBP with lifting        OBJECTIVE  Modalities Rationale:     decrease pain and increase tissue extensibility to improve patient's ability to perform ADLs without pain   min [] Estim, type/location:                                      []  att     []  unatt     []  w/US     []  w/ice    []  w/heat    min []  Mechanical Traction: type/lbs                   []  pro   []  sup   []  int   []  cont    []  before manual    []  after 10manual    min []  Ultrasound, settings/location:      min []  Iontophoresis w/ dexamethasone, location:                                               []  take home patch       []  in clinic   10 min []  Ice     [x]  Heat    location/position:  To neck and back    min []  Vasopneumatic Device, press/temp:     min []  Other:    [x] Skin assessment post-treatment (if applicable):    [x]  intact    []  redness- no adverse reaction     []redness  adverse reaction:        15 min Manual Therapy: DTM to C/S, PROM to C/S, DTM to L/S, GD IV PA mobs to T/S   Rationale:      decrease pain, increase ROM and increase tissue extensibility to improve patient's ability to perform ADLs without pain    30 min Therapeutic Exercise:  [x]  See flow sheet   Rationale:      increase ROM and increase strength to improve the patients ability to perform ADLs without pain     Billed With/As:   [x] TE   [] TA   [] Neuro   [] Self Care Patient Education: [x] Review HEP    [] Progressed/Changed HEP based on:   [] positioning   [] body mechanics   [] transfers   [] heat/ice application    [] other:        Other Objective/Functional Measures: Added REIL and bridge to therex  Lower pain levels this week   Post Treatment Pain Level (on 0 to 10) scale:   0  / 10     ASSESSMENT  Assessment/Changes in Function:     Less pain with ADLs  Full C/S PROM     []  See Progress Note/Recertification   Patient will continue to benefit from skilled PT services to modify and progress therapeutic interventions, address functional mobility deficits, address ROM deficits, address strength deficits, analyze and address soft tissue restrictions, analyze and cue movement patterns, analyze and modify body mechanics/ergonomics and assess and modify postural abnormalities to attain remaining goals.    Progress toward goals / Updated goals:    Less pain with ADLs     PLAN  [x]  Upgrade activities as tolerated YES Continue plan of care   []  Discharge due to :    []  Other:      Therapist: Caitlin Paul PT    Date: 11/5/2019 Time: 6:33 PM     Future Appointments   Date Time Provider Jeannine Garcia   11/7/2019  5:30 PM [facility]   11/12/2019  5:00 PM Ananth Marcial PTA [facility]   11/14/2019  5:30 PM René Raya PT [facility]   11/18/2019  5:00 PM Jenny Peacock PT [facility]   11/20/2019  5:00 PM Jenny Peacock PT [facility]   11/26/2019  5:00 PM Ananth Marcial PTA [facility]   11/27/2019  5:00 PM Jenny Peacock, PT [facility]

## 2019-11-07 ENCOUNTER — HOSPITAL ENCOUNTER (OUTPATIENT)
Dept: PHYSICAL THERAPY | Age: 29
Discharge: HOME | End: 2019-11-07
Payer: COMMERCIAL

## 2019-11-07 PROCEDURE — 97110 THERAPEUTIC EXERCISES: CPT

## 2019-11-08 NOTE — PROGRESS NOTES
PHYSICAL THERAPY - DAILY TREATMENT NOTE    Patient Name: Lolis Byrnes        Date: 2019  : 1990    Patient  Verified: YES  Visit #:     Insurance: Payor: Katelynn Vela / Plan: [facility] PPO / Product Type: PPO /      In time: 5:35 Out time: 6:35   Total Treatment Time: 60     Medicare/[employer] Time Tracking (below)   Total Timed Codes (min):  50 1:1 Treatment Time:  25     TREATMENT AREA/ DIAGNOSIS = Neck pain [M54.2]  Low back pain [M54.5]    SUBJECTIVE  Pain Level (on 0 to 10 scale):  3  / 10   Medication Changes/New allergies or changes in medical history, any new surgeries or procedures?     NO    If yes, update Summary List   Subjective Functional Status/Changes:  []  No changes reported     Functional improvement im having less pain with ADLs   Functional limitation pain with turning my head        OBJECTIVE  Modalities Rationale:     decrease pain and increase tissue extensibility to improve patient's ability to perform ADLs without pain   min [] Estim, type/location:                                      []  att     []  unatt     []  w/US     []  w/ice    []  w/heat    min []  Mechanical Traction: type/lbs                   []  pro   []  sup   []  int   []  cont    []  before manual    []  after manual    min []  Ultrasound, settings/location:      min []  Iontophoresis w/ dexamethasone, location:                                               []  take home patch       []  in clinic   10 min []  Ice     [x]  Heat    location/position:     min []  Vasopneumatic Device, press/temp:     min []  Other:    [x] Skin assessment post-treatment (if applicable):    [x]  intact    []  redness- no adverse reaction     []redness  adverse reaction:        50 min Therapeutic Exercise:  [x]  See flow sheet   Rationale:      increase ROM and increase strength to improve the patients ability to perform ADLs without pain     Billed With/As:   [x] TE   [] TA   [] Neuro   [] Self Care Patient Education: [x] Review HEP    [] Progressed/Changed HEP based on:   [] positioning   [] body mechanics   [] transfers   [] heat/ice application    [] other:        Other Objective/Functional Measures:    Pt with 3/10 pain  Unable to find mechanical cause for pain  Focused on stability exercises,as pt has full ROM    Post Treatment Pain Level (on 0 to 10) scale:   3  / 10     ASSESSMENT  Assessment/Changes in Function:     Full ROM     []  See Progress Note/Recertification   Patient will continue to benefit from skilled PT services to modify and progress therapeutic interventions, address functional mobility deficits, address ROM deficits, address strength deficits, analyze and address soft tissue restrictions, analyze and cue movement patterns, analyze and modify body mechanics/ergonomics and assess and modify postural abnormalities to attain remaining goals.    Progress toward goals / Updated goals:    No increase in pain with increased PREs     PLAN  [x]  Upgrade activities as tolerated YES Continue plan of care   []  Discharge due to :    []  Other:      Therapist: Hill Browning PT    Date: 11/7/2019 Time: 7:24 PM     Future Appointments   Date Time Provider Jeannine Garcia   11/12/2019  5:00 PM Aureliano Perea [facility]   11/14/2019  5:30 PM Sandra Allred PT [facility]   11/18/2019  5:00 PM Dang Arenas PT [facility]   11/20/2019  5:00 PM Dang Arenas PT [facility]   11/26/2019  5:00 PM Maninder Perkins PTA [facility]   11/27/2019  5:00 PM Dang Arenas PT [facility]

## 2019-11-12 ENCOUNTER — HOSPITAL ENCOUNTER (OUTPATIENT)
Dept: PHYSICAL THERAPY | Age: 29
Discharge: HOME | End: 2019-11-12
Payer: COMMERCIAL

## 2019-11-12 PROCEDURE — 97110 THERAPEUTIC EXERCISES: CPT

## 2019-11-12 NOTE — PROGRESS NOTES
PHYSICAL THERAPY - DAILY TREATMENT NOTE    Patient Name: Josh Doshi        Date: 2019  : 1990    Patient  Verified: YES  Visit #:   15   of   23  Insurance: Payor: Ivette Christian / Plan: [facility] PPO / Product Type: PPO /      In time: 6:00 Out time: 6:50   Total Treatment Time: 50     Medicare/[employer] Time Tracking (below)   Total Timed Codes (min):  40 1:1 Treatment Time:  40     TREATMENT AREA/ DIAGNOSIS = Neck pain [M54.2]  Low back pain [M54.5]    SUBJECTIVE  Pain Level (on 0 to 10 scale):  3  / 10 (L/S)   Medication Changes/New allergies or changes in medical history, any new surgeries or procedures? NO    If yes, update Summary List   Subjective Functional Status/Changes:  []  No changes reported     \"Neck is doing well; a little discomfort looking up/down. Yesterday, I was sitting in the chair and went to stand up and got shooting pain down my R leg to the back of my knee. I could barely walk. It is better today. \"       OBJECTIVE  Modalities Rationale:     decrease pain and increase tissue extensibility to improve patient's ability to perform ADLs without pain   min [] Estim, type/location:                                      []  att     []  unatt     []  w/US     []  w/ice    []  w/heat    min []  Mechanical Traction: type/lbs                   []  pro   []  sup   []  int   []  cont    []  before manual    []  after manual    min []  Ultrasound, settings/location:      min []  Iontophoresis w/ dexamethasone, location:                                               []  take home patch       []  in clinic   10 min []  Ice     [x]  Heat    location/position: C/S and L/S    min []  Vasopneumatic Device, press/temp:     min []  Other:    [x] Skin assessment post-treatment (if applicable):    [x]  intact    []  redness- no adverse reaction     []redness  adverse reaction:        40 min Therapeutic Exercise:  [x]  See flow sheet   Rationale:      increase ROM and increase strength to improve the patients ability to perform ADLs without pain     Billed With/As:   [x] TE   [] TA   [] Neuro   [] Self Care Patient Education: [x] Review HEP    [] Progressed/Changed HEP based on:   [] positioning   [] body mechanics   [] transfers   [] heat/ice application    [] other:        Other Objective/Functional Measures:    Advised pt to perform RILEY if radicular sx persist but to stop if sx worsen. Post Treatment Pain Level (on 0 to 10) scale:   0  / 10     ASSESSMENT  Assessment/Changes in Function:     Functional Improvements: Turning head for driving  Functional Limitations: Some R LE radicular sx yesterday. Weak core. []  See Progress Note/Recertification   Patient will continue to benefit from skilled PT services to modify and progress therapeutic interventions, address functional mobility deficits, address ROM deficits, address strength deficits, analyze and address soft tissue restrictions, analyze and cue movement patterns, analyze and modify body mechanics/ergonomics and assess and modify postural abnormalities to attain remaining goals.    Progress toward goals / Updated goals:    No increase in pain with increased PREs     PLAN  [x]  Upgrade activities as tolerated YES Continue plan of care   []  Discharge due to :    []  Other:      Therapist: Jocelynn Su PTA    Date: 11/12/2019 Time: 7:24 PM     Future Appointments   Date Time Provider Jeannine Garcia   11/12/2019  5:00 PM Luanne Farrell [facility]   11/14/2019  5:30 PM Cecile Cosme PT [facility]   11/18/2019  5:00 PM Michael Hill PT [facility]   11/20/2019  5:00 PM Michael Hill PT [facility]   11/26/2019  5:00 PM Sierra Valdez PTA [facility]   11/27/2019  5:00 PM Michael Hill PT [facility]

## 2019-11-14 ENCOUNTER — HOSPITAL ENCOUNTER (OUTPATIENT)
Dept: PHYSICAL THERAPY | Age: 29
Discharge: HOME | End: 2019-11-14
Payer: COMMERCIAL

## 2019-11-14 PROCEDURE — 97110 THERAPEUTIC EXERCISES: CPT

## 2019-11-14 NOTE — PROGRESS NOTES
PHYSICAL THERAPY - DAILY TREATMENT NOTE    Patient Name: Hazel Pulido        Date: 2019  : 1990    Patient  Verified: YES  Visit #:   15   of   23  Insurance: Payor: Lauri Vences / Plan: [facility] PPO / Product Type: PPO /      In time: 5:30 Out time: 6:10   Total Treatment Time: 40     Medicare/[facility] Time Tracking (below)   Total Timed Codes (min):  40 1:1 Treatment Time:  20     TREATMENT AREA/ DIAGNOSIS = Neck pain [M54.2]  Low back pain [M54.5]    SUBJECTIVE  Pain Level (on 0 to 10 scale):  3  / 10   Medication Changes/New allergies or changes in medical history, any new surgeries or procedures? NO    If yes, update Summary List   Subjective Functional Status/Changes:  []  No changes reported     Functional improvement no LE pain today   Functional limitation pain with sitting too long        OBJECTIVE      40 min Therapeutic Exercise:  [x]  See flow sheet   Rationale:      increase ROM and increase strength to improve the patients ability to perform ADLs without pain     Billed With/As:   [x] TE   [] TA   [] Neuro   [] Self Care Patient Education: [x] Review HEP    [] Progressed/Changed HEP based on:   [] positioning   [] body mechanics   [] transfers   [] heat/ice application    [] other:        Other Objective/Functional Measures:    Pt with no R LE symptoms today  Resumed all therex  Pt declined MHP   Post Treatment Pain Level (on 0 to 10) scale:   2.5  / 10     ASSESSMENT  Assessment/Changes in Function:     A little less pain after therapy     []  See Progress Note/Recertification   Patient will continue to benefit from skilled PT services to modify and progress therapeutic interventions, address functional mobility deficits, address ROM deficits, address strength deficits, analyze and address soft tissue restrictions, analyze and cue movement patterns, analyze and modify body mechanics/ergonomics and assess and modify postural abnormalities to attain remaining goals. Progress toward goals / Updated goals:    Less pain in general     PLAN  [x]  Upgrade activities as tolerated YES Continue plan of care   []  Discharge due to :    []  Other:      Therapist: Angela Claire PT    Date: 11/14/2019 Time: 6:49 PM     Future Appointments   Date Time Provider Jeannine Garcia   11/18/2019  5:00 PM Lucas Pan PT [facility]   11/20/2019  5:00 PM Lucas Pan PT [facility]   11/26/2019  5:00 PM Gerry Rios PTA [facility]   11/27/2019  5:00 PM Lucas Pan, PT [facility]

## 2019-11-18 ENCOUNTER — HOSPITAL ENCOUNTER (OUTPATIENT)
Dept: PHYSICAL THERAPY | Age: 29
Discharge: HOME | End: 2019-11-18
Payer: COMMERCIAL

## 2019-11-18 PROCEDURE — 97110 THERAPEUTIC EXERCISES: CPT

## 2019-11-18 PROCEDURE — 97140 MANUAL THERAPY 1/> REGIONS: CPT

## 2019-11-18 NOTE — PROGRESS NOTES
PHYSICAL THERAPY - DAILY TREATMENT NOTE    Patient Name: Braulio Jacobo        Date: 2019  : 1990    Patient  Verified: YES  Visit #:     Insurance: Payor: BLUE [employer]OSS / Plan: [facility] PPO / Product Type: PPO /      In time: 5:30 Out time: 6:10   Total Treatment Time: 40     Medicare/[facility] Time Tracking (below)   Total Timed Codes (min):  40 1:1 Treatment Time:  25     TREATMENT AREA/ DIAGNOSIS = Neck pain [M54.2]  Low back pain [M54.5]    SUBJECTIVE  Pain Level (on 0 to 10 scale):  3  / 10   Medication Changes/New allergies or changes in medical history, any new surgeries or procedures? NO    If yes, update Summary List   Subjective Functional Status/Changes:  []  No changes reported     Pt with less pain overall but noting increased pain this day related to more work activity. Pt has MD appt for follow up in 2 weeks. Still having trouble bending forward and looking R in cervical region. OBJECTIVE  30/15 min Therapeutic Exercise:   [x]  See flow sheet   Rationale:      increase ROM and increase strength to improve the patients ability to perform ADLs without pain       10 min Manual Therapy: STM B UT, lumbar paraspinals, SOR, MONA   Rationale:      increase ROM and increase strength to improve the patients ability to perform ADLs without pain     Billed With/As:   [x] TE   [] TA   [] Neuro   [] Self Care Patient Education: [x] Review HEP    [] Progressed/Changed HEP based on:   [] positioning   [] body mechanics   [] transfers   [] heat/ice application    [] other:        Other Objective/Functional Measures:  Pt with DP cervical and lumbar rotation B at moderate pain level at initiation of tx. Post Treatment Pain Level (on 0 to 10) scale:   10     ASSESSMENT  Assessment/Changes in Function:     Pain less post tx and improved cervical and lumbar rotation to functional and painful at mild from moderate.      []  See Progress Note/Recertification   Patient will continue to benefit from skilled PT services to modify and progress therapeutic interventions, address functional mobility deficits, address ROM deficits, address strength deficits, analyze and address soft tissue restrictions, analyze and cue movement patterns, analyze and modify body mechanics/ergonomics and assess and modify postural abnormalities to attain remaining goals. Progress toward goals / Updated goals:     4. Pain free L/S AROM--improved lumbar rotation to mild post tx, fair progress.           PLAN  [x]  Upgrade activities as tolerated YES Continue plan of care   []  Discharge due to :    [x]  Other: Nuria Trujillo and GROFANNY at next visit.      Therapist: Anne-Marie Ba PT    Date: 11/18/2019 Time: 6 PM     Future Appointments   Date Time Provider Jaennine Garcia   11/20/2019  5:00 PM Radha Lima [facility]   11/25/2019  5:00 PM Estefani Wolfe [facility]   11/27/2019  5:00 PM Jt Keene PT [facility]

## 2019-11-20 ENCOUNTER — HOSPITAL ENCOUNTER (OUTPATIENT)
Dept: PHYSICAL THERAPY | Age: 29
Discharge: HOME | End: 2019-11-20
Payer: COMMERCIAL

## 2019-11-20 PROCEDURE — 97140 MANUAL THERAPY 1/> REGIONS: CPT

## 2019-11-20 PROCEDURE — 97110 THERAPEUTIC EXERCISES: CPT

## 2019-11-20 NOTE — PROGRESS NOTES
PHYSICAL THERAPY - DAILY TREATMENT NOTE     Patient Name: Marily Márquez        Date: 2019  : 1990   YES Patient  Verified  Visit #:     Insurance: Payor: BLUE CROSS / Plan: [facility] / Product Type: PPO /      In time: 5 Out time: 6   Total Treatment Time: 60     Medicare/[employer] Time Tracking (below)   Total Timed Codes (min):  60 1:1 Treatment Time:  40     TREATMENT AREA =  Neck pain [M54.2]  Low back pain [M54.5]    SUBJECTIVE    Pain Level (on 0 to 10 scale):  3  / 10   Medication Changes/New allergies or changes in medical history, any new surgeries or procedures? NO    If yes, update Summary List   Subjective Functional Status/Changes:  []  No changes reported       Functional improvements: Pt with report of reduced LBP, less pain overall. Functional impairments: Cervical and lumbar pain with work, ADLs. OBJECTIVE    45/25 min Therapeutic Exercise:  [x]  See flow sheet   Rationale:      increase ROM and increase strength to improve the patients ability to perform work, ADLs with less pain     15 min Manual Therapy: Technique:      [x] S/DTM []IASTM []PROM [] Passive Stretching   []manual TPR    []Jt manipulation:Gr I [] II []  III [] IV[] V[]  Treatment Area:  Cervical STM B UT, lumbar STM R QL, lumbar paraspinals   Rationale:      decrease pain, increase ROM and increase tissue extensibility to improve patient's ability to perform work with less pain. Billed With/As:   [x] TE   [] TA   [] Neuro   [] Self Care Patient Education: [x] Review HEP    [] Progressed/Changed HEP based on:   [] positioning   [] body mechanics   [] transfers   [] heat/ice application    [] other:        Other Objective/Functional Measures:    GROC +4  FOTO 50 from 46.      Post Treatment Pain Level (on 0 to 10) scale:    10     ASSESSMENT    Assessment/Changes in Function:     Pt with improvements in subjective testing, limited in functional movement patterns, pain with cervical and lumbar ext, rotation B.     []  See Progress Note/Recertification   Patient will continue to benefit from skilled PT services to modify and progress therapeutic interventions, address functional mobility deficits, address ROM deficits, address strength deficits, analyze and cue movement patterns, analyze and modify body mechanics/ergonomics and instruct in home and community integration to attain remaining goals. Progress toward goals / Updated goals:    1.   Pt will report GROC of >=+5, to indicate increased function--+4, good progress          PLAN    [x]  Upgrade activities as tolerated YES Continue plan of care   []  Discharge due to :    []  Other:      Therapist: Rodney Garcia, PT    Date: 11/20/2019 Time: 4 PM     Future Appointments   Date Time Provider Jeannine Garcia   11/20/2019  5:00 PM Nolberto Unger [facility]   11/25/2019  5:00 PM Nolberto Cr [facility]   11/27/2019  5:00 PM Jalyn Stephens, PT [facility] AT [facility]

## 2019-11-21 ENCOUNTER — APPOINTMENT (OUTPATIENT)
Dept: PHYSICAL THERAPY | Age: 29
End: 2019-11-21
Payer: COMMERCIAL

## 2019-11-25 ENCOUNTER — HOSPITAL ENCOUNTER (OUTPATIENT)
Dept: PHYSICAL THERAPY | Age: 29
Discharge: HOME | End: 2019-11-25
Payer: COMMERCIAL

## 2019-11-25 PROCEDURE — 97140 MANUAL THERAPY 1/> REGIONS: CPT

## 2019-11-25 PROCEDURE — 97110 THERAPEUTIC EXERCISES: CPT

## 2019-11-25 NOTE — PROGRESS NOTES
PHYSICAL THERAPY - DAILY TREATMENT NOTE    Patient Name: Ayla Farris        Date: 2019  : 1990    Patient  Verified: YES  Visit #:   16   of   20  Insurance: Payor: [address] [place] [address] / Plan: [facility] PPO / Product Type: PPO /      In time: 5:40 Out time: 6:30   Total Treatment Time: 50     Medicare Time Tracking (below)   Total Timed Codes (min):  - 1:1 Treatment Time:  -     TREATMENT AREA/ DIAGNOSIS = Neck pain [M54.2]  Low back pain [M54.5]    SUBJECTIVE  Pain Level (on 0 to 10 scale):  2.5  / 10   Medication Changes/New allergies or changes in medical history, any new surgeries or procedures? NO    If yes, update Summary List   Subjective Functional Status/Changes:  []  No changes reported     Pt reports that his right side of his neck is still bothering him.  Pt states that it is better than before though      OBJECTIVE  Modalities Rationale:     decrease inflammation and increase tissue extensibility to improve patient's ability to perform ADLs without pain     min [] Estim, type/location:                                      []  att     []  unatt     []  w/US     []  w/ice    []  w/heat    min []  Mechanical Traction: type/lbs                   []  pro   []  sup   []  int   []  cont    []  before manual    []  after manual    min []  Ultrasound, settings/location:      min []  Iontophoresis w/ dexamethasone, location:                                               []  take home patch       []  in clinic   10 min []  Ice     [x]  Heat    location/position: c/s    min []  Vasopneumatic Device, press/temp:     min []  Other:    [] Skin assessment post-treatment (if applicable):    []  intact    []  redness- no adverse reaction     []redness  adverse reaction:        30 min Therapeutic Exercise:  [x]  See flow sheet   Rationale:      increase strength and improve coordination to improve the patients ability to perform ADLs without pain       10 min Manual Therapy: STM to c/s Rationale:      increase ROM and increase tissue extensibility to improve patient's ability to perform ADLs without pain    Billed With/As:   [x] TE   [] TA   [] Neuro   [] Self Care Patient Education: [x] Review HEP    [] Progressed/Changed HEP based on:   [] positioning   [] body mechanics   [] transfers   [] heat/ice application    [] other:        Other Objective/Functional Measures:    No increase in pain during treatment session  TTP to B upper trap  Initiated squats and 1/2 kneeling chops   Post Treatment Pain Level (on 0 to 10) scale:   0  / 10     ASSESSMENT  Assessment/Changes in Function:     Pt showing improved activity tolerance. []  See Progress Note/Recertification   Patient will continue to benefit from skilled PT services to modify and progress therapeutic interventions, address functional mobility deficits, address strength deficits, analyze and address soft tissue restrictions and analyze and cue movement patterns to attain remaining goals.    Progress toward goals / Updated goals:    Good Progress to    [] STG    [x] LTG  2 as shown by improved pain levels with ADLs     PLAN  [x]  Upgrade activities as tolerated YES Continue plan of care   []  Discharge due to :    []  Other:      Therapist: Kymberly Escamilla DPT     Date: 11/25/2019 Time: 6:38 PM        Future Appointments   Date Time Provider Jeannine Garcia   11/27/2019  5:00 PM Steven Estrada PT [facility] AT [facility]

## 2019-11-26 ENCOUNTER — APPOINTMENT (OUTPATIENT)
Dept: PHYSICAL THERAPY | Age: 29
End: 2019-11-26
Payer: COMMERCIAL

## 2019-11-27 ENCOUNTER — HOSPITAL ENCOUNTER (OUTPATIENT)
Dept: PHYSICAL THERAPY | Age: 29
Discharge: HOME | End: 2019-11-27
Payer: COMMERCIAL

## 2019-11-27 PROCEDURE — 97110 THERAPEUTIC EXERCISES: CPT

## 2019-11-27 PROCEDURE — 97140 MANUAL THERAPY 1/> REGIONS: CPT

## 2019-11-27 NOTE — PROGRESS NOTES
Mesfin Chaves [address]  [facility] PHYSICAL THERAPY AT [address] [place] [address], [address] [place] [address], [address] [place] [address], [address] [place] [address]  Phone: [phone]  Fax: [phone]  PROGRESS NOTE  Patient Name: Yang Rater : 1990   Treatment/Medical Diagnosis: Neck pain [M54.2]  Low back pain [M54.5]   Referral Source: Rajwinder Castillo MD     Date of Initial Visit: 19 Attended Visits:  Missed Visits: 0     SUMMARY OF TREATMENT  Manual therapy, including joint mobility and soft tissue mobility C/S and L/S. Therex for core stability and LE, UE strengthening, stretching program.  Patient education on posture/body mechanics and HEP. CURRENT STATUS  The patient has demonstrated significant improvement since last update 10/24/19. Goal/Measure of Progress Goal Met? 1. Increase FOTO score to >=60, to indicate increased function    Status at last Eval: 46 Current Status: 50 progressing   2. Pain free Lumbar and cervical ROM   Status at last Eval: Dysfunctional and painful B cervical and lumbar rotation Current Status: Full cervical AROM Progressing, lumbar AROM dysfunctional and painful with flexion, ext and B rotation   3. Pt with < = 2/10 pain    Status at last Eval: Pain up to 6/10 Current Status: Pain up to 3-4/10 Progressing slowly   4. Pt will demonstrate a GROC score of >=+5, to indicate increased function   Status at last Eval: - Current Status: +4 progressing     New Goals to be achieved in __4-5__  weeks:  1. Pt will report GROC of >=+5, to indicate increased function   2. Increase FOTO score to >=60, to indicate increased function   3. Pt with < = 2/10 pain with ADLs. 4.  Pain free L/S AROM       RECOMMENDATIONS  Continue PT 1-2x/week x 4-5 visits with D/C to HEP at that time. If you have any questions/comments please contact us directly at [phone]. Thank you for allowing us to assist in the care of your patient.     Therapist Signature: Zachariah Saenz PT Date: 2019     Time: 6 PM NOTE TO PHYSICIAN:  PLEASE COMPLETE THE ORDERS BELOW AND FAX TO   [facility] at [address]: [phone]. If you are unable to process this request in 24 hours please contact our office: [phone].    ___ I have read the above report and request that my patient continue as recommended.   ___ I have read the above report and request that my patient continue therapy with the following changes/special instructions:_________________________________________________   ___ I have read the above report and request that my patient be discharged from therapy.      Physician Signature:                   Date:       Time:

## 2019-11-27 NOTE — PROGRESS NOTES
PHYSICAL THERAPY - DAILY TREATMENT NOTE     Patient Name: Josh Doshi        Date: 2019  : 1990   YES Patient  Verified  Visit #:     Insurance: Payor: Ivette Christian / Plan: [facility] PPO / Product Type: PPO /      In time: 510 Out time: 610   Total Treatment Time: 60     Medicare/[employer] Time Tracking (below)   Total Timed Codes (min):  60 1:1 Treatment Time:  40     TREATMENT AREA =  Neck pain [M54.2]  Low back pain [M54.5]    SUBJECTIVE    Pain Level (on 0 to 10 scale): 2.5 / 10   Medication Changes/New allergies or changes in medical history, any new surgeries or procedures? NO    If yes, update Summary List   Subjective Functional Status/Changes:  []  No changes reported     See PN. OBJECTIVE    50  min Therapeutic Exercise:  [x]  See flow sheet   Rationale:      increase ROM and increase strength to improve the patients ability to perform work, ADLs with less pain     10 min Manual Therapy: Technique:      [x] S/DTM []IASTM []PROM [] Passive Stretching   []manual TPR    []Jt manipulation:Gr I [] II []  III [] IV[] V[]  Treatment Area:  Cervical--MONA, L downglides mid-cervical Lumbar IASTM to B lumbar paraspinals f/b PA mobility in lower thoracic and upper lumbar Gr 1-4. Rationale:      decrease pain, increase ROM and increase tissue extensibility to improve patient's ability to perform work with less pain. Billed With/As:   [x] TE   [] TA   [] Neuro   [] Self Care Patient Education: [x] Review HEP    [] Progressed/Changed HEP based on:   [] positioning   [] body mechanics   [] transfers   [] heat/ice application    [] other:        Other Objective/Functional Measures:    See PN. Added 80/20 squat with TRX, squats and chops and lifts in 1/2 kneeling.      Post Treatment Pain Level (on 0 to 10) scale:   2  / 10     ASSESSMENT    Assessment/Changes in Function:     See PN.     []  See Progress Note/Recertification   Patient will continue to benefit from skilled PT services to modify and progress therapeutic interventions, address functional mobility deficits, address ROM deficits, address strength deficits, analyze and cue movement patterns, analyze and modify body mechanics/ergonomics and instruct in home and community integration to attain remaining goals. Progress toward goals / Updated goals:    See PN. PLAN    [x]  Upgrade activities as tolerated YES Continue plan of care   []  Discharge due to :    []  Other:      Therapist: Lucretia Nuñez PT    Date: 11/27/2019 Time: 6 PM     No future appointments.

## 2019-12-02 ENCOUNTER — HOSPITAL ENCOUNTER (OUTPATIENT)
Dept: PHYSICAL THERAPY | Age: 29
Discharge: HOME | End: 2019-12-02
Payer: COMMERCIAL

## 2019-12-02 PROCEDURE — 97110 THERAPEUTIC EXERCISES: CPT

## 2019-12-02 PROCEDURE — 97140 MANUAL THERAPY 1/> REGIONS: CPT

## 2019-12-02 NOTE — PROGRESS NOTES
PHYSICAL THERAPY - DAILY TREATMENT NOTE      Patient Name: Daniel Ohara        Date: 2019  : 1990   YES Patient  Verified  Visit #:     Insurance: Payor: Michelle Perdomo / Plan: [facility] PPO / Product Type: PPO /      In time: 5:05 Out time: 5:50   Total Treatment Time: 45     Medicare/[employer] Time Tracking (below)   Total Timed Codes (min):  45 1:1 Treatment Time:  45     TREATMENT AREA =  Neck pain [M54.2]  Low back pain [M54.5]    SUBJECTIVE    Pain Level (on 0 to 10 scale):  2  / 10   Medication Changes/New allergies or changes in medical history, any new surgeries or procedures? NO    If yes, update Summary List   Subjective Functional Status/Changes:  []  No changes reported       Functional improvements: less pain at work  Functional impairments: overhead work         OBJECTIVE    35 min Therapeutic Exercise:  [x]  See flow sheet   Rationale:      increase ROM and increase strength to improve the patients ability to work     10 min Manual Therapy: Technique:      [] S/DTM []IASTM []PROM [] Passive Stretching   []manual TPR    []Jt manipulation:Gr I [] II []  III [] IV[] V[]  Treatment Area:  SOR, DTM cervical paraspinals, left mid cervical downglides grade IV; prone lower T/S PAmobs grade IV/V, DTM lumbar paraspinals   Rationale:      increase tissue extensibility to improve patient's ability to work    Billed With/As:   [x] TE   [] TA   [] Neuro   [] Self Care Patient Education: [x] Review HEP    [] Progressed/Changed HEP based on:   [] positioning   [] body mechanics   [] transfers   [] heat/ice application    [] other:      Other Objective/Functional Measures:    Patient tolerated therex progression without complaints of increased pain. Post Treatment Pain Level (on 0 to 10) scale:    10     ASSESSMENT    Assessment/Changes in Function:     Patient reporting decreased complaints of pain at work.      []  See Progress Note/Recertification   Patient will continue to benefit from skilled PT services to modify and progress therapeutic interventions, address functional mobility deficits, address ROM deficits, address strength deficits, analyze and address soft tissue restrictions, analyze and cue movement patterns, analyze and modify body mechanics/ergonomics and assess and modify postural abnormalities to attain remaining goals. to attain remaining goals. Progress toward goals / Updated goals:    Good Progress to    [] STG    [x] LTG  3 as shown by pain 2/10.      PLAN    [x]  Upgrade activities as tolerated YES Continue plan of care   []  Discharge due to :    []  Other:      Therapist: Christopher Bland PT    Date: 12/2/2019 Time: 5:20 PM     Future Appointments   Date Time Provider Jeannine Garcia   12/9/2019  5:00 [facility]   12/11/2019  5:00 PM [facility]   12/18/2019  5:00 PM Ethelda Leyden, PT [facility] AT [facility]   12/23/2019  5:00 PM [facility] AT [facility]

## 2019-12-09 ENCOUNTER — HOSPITAL ENCOUNTER (OUTPATIENT)
Dept: PHYSICAL THERAPY | Age: 29
Discharge: HOME | End: 2019-12-09
Payer: COMMERCIAL

## 2019-12-09 PROCEDURE — 97110 THERAPEUTIC EXERCISES: CPT

## 2019-12-09 PROCEDURE — 97140 MANUAL THERAPY 1/> REGIONS: CPT

## 2019-12-09 NOTE — PROGRESS NOTES
PHYSICAL THERAPY - DAILY TREATMENT NOTE    Patient Name: Donita Common        Date: 2019  : 1990    Patient  Verified: YES  Visit #:     Insurance: Payor: Helen Yu / Plan: [facility] PPO / Product Type: PPO /      In time: 5:00 Out time: 5:50   Total Treatment Time: 50     Medicare Time Tracking (below)   Total Timed Codes (min):  - 1:1 Treatment Time:  -     TREATMENT AREA/ DIAGNOSIS = Neck pain [M54.2]  Low back pain [M54.5]    SUBJECTIVE  Pain Level (on 0 to 10 scale):  2  / 10   Medication Changes/New allergies or changes in medical history, any new surgeries or procedures? NO    If yes, update Summary List   Subjective Functional Status/Changes:  []  No changes reported     Pt still reports neck pain with ADLs at the C/t junction. OBJECTIVE  Modalities Rationale:     decrease inflammation and decrease pain to improve patient's ability to perform ADLs without pain     min [] Estim, type/location:                                      []  att     []  unatt     []  w/US     []  w/ice    []  w/heat    min []  Mechanical Traction: type/lbs                   []  pro   []  sup   []  int   []  cont    []  before manual    []  after manual    min []  Ultrasound, settings/location:      min []  Iontophoresis w/ dexamethasone, location:                                               []  take home patch       []  in clinic   10 min []  Ice     [x]  Heat    location/position: c/s    min []  Vasopneumatic Device, press/temp:     min []  Other:    [] Skin assessment post-treatment (if applicable):    []  intact    []  redness- no adverse reaction     []redness  adverse reaction:        30 min Therapeutic Exercise:  [x]  See flow sheet   Rationale:      increase strength and improve coordination to improve the patients ability to perform ADLs without pain     10 min Manual Therapy: STM to c/s B upper traps and levators.     Rationale:      increase ROM and increase tissue extensibility to improve patient's ability to perform ADLs without pain    Billed With/As:   [x] TE   [] TA   [] Neuro   [] Self Care Patient Education: [x] Review HEP    [] Progressed/Changed HEP based on:   [] positioning   [] body mechanics   [] transfers   [] heat/ice application    [] other:        Other Objective/Functional Measures:    Increased trigger points to b upper traps. Post Treatment Pain Level (on 0 to 10) scale:   1-2  / 10     ASSESSMENT  Assessment/Changes in Function:     Pt showing slow progress with pain levels. Pt showing decreased need for cueing with therapeutic exercises     []  See Progress Note/Recertification   Patient will continue to benefit from skilled PT services to modify and progress therapeutic interventions, address functional mobility deficits, address ROM deficits and address strength deficits to attain remaining goals.    Progress toward goals / Updated goals:    Slow Progress to    [] STG    [x] LTG  3 as shown by still having consistent pain levels     PLAN  [x]  Upgrade activities as tolerated YES Continue plan of care   []  Discharge due to :    []  Other:      Therapist: Kymberly Escamilla DPT     Date: 12/9/2019 Time: 5:20 PM        Future Appointments   Date Time Provider Jeannine Garcia   12/11/2019  5:00 PM Neida Vieira [facility] AT [facility]   12/16/2019  5:00 PM Roxana Barrera, PT [facility] AT [facility]   12/18/2019  4:30 PM Elyse May, PT [facility] AT [facility]

## 2019-12-11 ENCOUNTER — HOSPITAL ENCOUNTER (OUTPATIENT)
Dept: PHYSICAL THERAPY | Age: 29
Discharge: HOME | End: 2019-12-11
Payer: COMMERCIAL

## 2019-12-11 PROCEDURE — 97140 MANUAL THERAPY 1/> REGIONS: CPT

## 2019-12-11 PROCEDURE — 97110 THERAPEUTIC EXERCISES: CPT

## 2019-12-11 NOTE — PROGRESS NOTES
PHYSICAL THERAPY - DAILY TREATMENT NOTE    Patient Name: Ayla Farris        Date: 2019  : 1990    Patient  Verified: YES  Visit #:     Insurance: Payor: Mackenzie Wolfe / Plan: [facility] PPO / Product Type: PPO /      In time: 5:05 Out time: 5:55   Total Treatment Time: 50     Medicare Time Tracking (below)   Total Timed Codes (min):  - 1:1 Treatment Time:  -     TREATMENT AREA/ DIAGNOSIS = Neck pain [M54.2]  Low back pain [M54.5]    SUBJECTIVE  Pain Level (on 0 to 10 scale):  1-2  / 10   Medication Changes/New allergies or changes in medical history, any new surgeries or procedures?     NO    If yes, update Summary List   Subjective Functional Status/Changes:  []  No changes reported     Pt reports that he is still having some slight constant pain in his neck with ADLs      OBJECTIVE      40 min Therapeutic Exercise:  [x]  See flow sheet   Rationale:      increase strength and improve coordination to improve the patients ability to perform ADLs without pain       10 min Manual Therapy: STM to c/s   Rationale:      increase ROM and increase tissue extensibility to improve patient's ability to perform ADLs without pain      Billed With/As:   [x] TE   [] TA   [] Neuro   [] Self Care Patient Education: [x] Review HEP    [] Progressed/Changed HEP based on:   [] positioning   [] body mechanics   [] transfers   [] heat/ice application    [] other:        Other Objective/Functional Measures:    Increase trigger points to R upper trap   Post Treatment Pain Level (on 0 to 10) scale:   1  / 10     ASSESSMENT  Assessment/Changes in Function:     Pt required decreased cueing for proper mechanics with therapeutic exercises     []  See Progress Note/Recertification   Patient will continue to benefit from skilled PT services to modify and progress therapeutic interventions, address functional mobility deficits, address ROM deficits, address strength deficits, analyze and address soft tissue restrictions and analyze and cue movement patterns to attain remaining goals.    Progress toward goals / Updated goals:    Slow Progress to    [] STG    [x] LTG  2 as shown by still having pain levels 1-2     PLAN  [x]  Upgrade activities as tolerated YES Continue plan of care   []  Discharge due to :    []  Other:      Therapist: Iveth Germain DPT     Date: 12/11/2019 Time: 5:29 PM        Future Appointments   Date Time Provider Jeannine Garcia   12/16/2019  5:00 PM Trinda Fabry, PT [facility] AT [facility]   12/18/2019  4:30 PM Richard Art, PT [facility] AT [facility]

## 2019-12-16 ENCOUNTER — HOSPITAL ENCOUNTER (OUTPATIENT)
Dept: PHYSICAL THERAPY | Age: 29
Discharge: HOME | End: 2019-12-16
Payer: COMMERCIAL

## 2019-12-16 PROCEDURE — 97110 THERAPEUTIC EXERCISES: CPT

## 2019-12-16 PROCEDURE — 97140 MANUAL THERAPY 1/> REGIONS: CPT

## 2019-12-16 NOTE — PROGRESS NOTES
PHYSICAL THERAPY - DAILY TREATMENT NOTE      Patient Name: Amor De La Cruz        Date: 2019  : 1990   YES Patient  Verified  Visit #:     Insurance: Payor: BLUE CROSS / Plan: [facility] PPO / Product Type: PPO /      In time: 11:30 Out time: 12:30   Total Treatment Time: 60     Medicare/[employer] Time Tracking (below)   Total Timed Codes (min):  50 1:1 Treatment Time:  30     TREATMENT AREA =  Neck pain [M54.2]  Low back pain [M54.5]    SUBJECTIVE    Pain Level (on 0 to 10 scale):  1  / 10   Medication Changes/New allergies or changes in medical history, any new surgeries or procedures? NO    If yes, update Summary List   Subjective Functional Status/Changes:  []  No changes reported       Functional improvements: less pain at work  Functional impairments: lifting         OBJECTIVE    45 min Therapeutic Exercise:  [x]  See flow sheet   Rationale:      increase ROM and increase strength to improve the patients ability to work     15 min Manual Therapy: Technique:      [] S/DTM []IASTM []PROM [] Passive Stretching   []manual TPR    []Jt manipulation:Gr I [] II []  III [] IV[] V[]  Treatment Area:  SOR, DTM cervical paraspinals, UT; S/L DTM lumbar   Rationale:      decrease pain, increase ROM and increase tissue extensibility to improve patient's ability to work    Billed With/As:   [x] TE   [] TA   [] Neuro   [] Self Care Patient Education: [x] Review HEP    [] Progressed/Changed HEP based on:   [] positioning   [] body mechanics   [] transfers   [] heat/ice application    [] other:      Other Objective/Functional Measures:    No reports of pain with therex. Post Treatment Pain Level (on 0 to 10) scale:   0  / 10     ASSESSMENT    Assessment/Changes in Function:     Discussed D/C next visit.      []  See Progress Note/Recertification   Patient will continue to benefit from skilled PT services to modify and progress therapeutic interventions, address functional mobility deficits, address ROM deficits, address strength deficits, analyze and address soft tissue restrictions, analyze and cue movement patterns, analyze and modify body mechanics/ergonomics and assess and modify postural abnormalities to attain remaining goals. to attain remaining goals. Progress toward goals / Updated goals:    Good Progress to    [] STG    [x] LTG  3 as shown by pain 2/10.      PLAN    [x]  Upgrade activities as tolerated YES Continue plan of care   []  Discharge due to :    []  Other:      Therapist: Sameera Wright PT    Date: 12/16/2019 Time: 11:44 AM     Future Appointments   Date Time Provider Jeannine Garcia   12/18/2019  4:30 PM Moe Mota, [facility]

## 2019-12-18 ENCOUNTER — APPOINTMENT (OUTPATIENT)
Dept: PHYSICAL THERAPY | Age: 29
End: 2019-12-18
Payer: COMMERCIAL

## 2019-12-18 ENCOUNTER — HOSPITAL ENCOUNTER (OUTPATIENT)
Dept: PHYSICAL THERAPY | Age: 29
Discharge: HOME | End: 2019-12-18
Payer: COMMERCIAL

## 2019-12-18 PROCEDURE — 97110 THERAPEUTIC EXERCISES: CPT

## 2019-12-18 PROCEDURE — 97140 MANUAL THERAPY 1/> REGIONS: CPT

## 2019-12-18 NOTE — PROGRESS NOTES
PHYSICAL THERAPY - DAILY TREATMENT NOTE      Patient Name: Cristal Ashton        Date: 2019  : 1990   YES Patient  Verified  Visit #:     Insurance: Payor: BLUE CROSS / Plan: [facility] PPO / Product Type: PPO /      In time: 430 Out time: 5:30   Total Treatment Time: 60     Medicare/[facility] Time Tracking (below)   Total Timed Codes (min):  50 1:1 Treatment Time:  40     TREATMENT AREA =  Neck pain [M54.2]  Low back pain [M54.5]    SUBJECTIVE    Pain Level (on 0 to 10 scale):  1  / 10   Medication Changes/New allergies or changes in medical history, any new surgeries or procedures? NO    If yes, update Summary List   Subjective Functional Status/Changes:  []  No changes reported     See D/C     OBJECTIVE    50/30 min Therapeutic Exercise:  [x]  See flow sheet   Rationale:      increase ROM and increase strength to improve the patients ability to work     10 min Manual Therapy: Technique:      [] S/DTM []IASTM []PROM [] Passive Stretching   []manual TPR    []Jt manipulation:Gr I [] II []  III [] IV[] V[]  Treatment Area:  DTM cervical paraspinals, UT; S/L DTM lumbar   Rationale:      decrease pain, increase ROM and increase tissue extensibility to improve patient's ability to work    Billed With/As:   [x] TE   [] TA   [] Neuro   [] Self Care Patient Education: [x] Review HEP    [] Progressed/Changed HEP based on:   [] positioning   [] body mechanics   [] transfers   [] heat/ice application    [] other:      Other Objective/Functional Measures:    See D/C, pt issued updated HEP. Post Treatment Pain Level (on 0 to 10) scale:   0  / 10     ASSESSMENT    Assessment/Changes in Function:     D/C completed.      []  See Progress Note/Recertification   Patient will continue to benefit from skilled PT services to modify and progress therapeutic interventions, address functional mobility deficits, address ROM deficits, address strength deficits, analyze and address soft tissue restrictions, analyze and cue movement patterns, analyze and modify body mechanics/ergonomics and assess and modify postural abnormalities to attain remaining goals. to attain remaining goals. See D/C     PLAN    D/C to HEP. Therapist: Christopher Cordova, PT    Date: 12/18/2019 Time: 5pm     No future appointments.

## 2019-12-18 NOTE — PROGRESS NOTES
[address] [facility] PHYSICAL THERAPY AT [address] [facility], [address] [place] [address], [address] [place] [address], [address] [place] [address]  Phone: [phone]  Fax: [phone] SUMMARY  Patient Name: Lolis Byrnes : 1990   Treatment/Medical Diagnosis: Neck pain [M54.2]  Low back pain [M54.5]   Referral Source: Delta Rivera MD     Date of Initial Visit: 19 Attended Visits:  Missed Visits: 0     SUMMARY OF TREATMENT  Manual therapy, including joint mobility and soft tissue mobility cervical and lumbar spine region. Therex and NMR for core stability and LE, UE strengthening, stretching program.  Patient education on posture/body mechanics and HEP. CURRENT STATUS  The patient has demonstrated significant improvement since last update 19. Goal/Measure of Progress Goal Met? 1. Increase FOTO score to >=60, to indicate increased function    Status at last Eval: 46 Current Status: 50 progressing   2. Pain free Lumbar and cervical ROM   Status at last Eval: Dysfunctional and painful B cervical and lumbar rotation Current Status: Functional and nonpainful lumbar, cervical motion Met   3. Pt with < = 2/10 pain    Status at last Eval: Pain up to 6/10 Current Status: Pain up to 2-3/10 Progressing slowly   4. Pt will demonstrate a GROC score of >=+5, to indicate increased function   Status at last Eval: - Current Status: +4 progressing       RECOMMENDATIONS  D/C from PT to HEP at this time. He continues to demonstrate gradual progress toward LTGs and is expected to meet all goals. If you have any questions/comments please contact us directly at [phone]. Thank you for allowing us to assist in the care of your patient.     Therapist Signature: Arie Giraldo, PT Date: 2019   Reporting Period:   19-19 Time: 6 PM

## 2019-12-23 ENCOUNTER — APPOINTMENT (OUTPATIENT)
Dept: PHYSICAL THERAPY | Age: 29
End: 2019-12-23
Payer: COMMERCIAL

## 2020-03-05 ENCOUNTER — HOSPITAL ENCOUNTER (OUTPATIENT)
Dept: PHYSICAL THERAPY | Age: 30
Discharge: HOME | End: 2020-03-05
Payer: COMMERCIAL

## 2020-03-05 PROCEDURE — 97161 PT EVAL LOW COMPLEX 20 MIN: CPT

## 2020-03-05 PROCEDURE — 97140 MANUAL THERAPY 1/> REGIONS: CPT

## 2020-03-05 NOTE — PROGRESS NOTES
[facility] PHYSICAL THERAPY AT [facility] [address], [address] - Phone: [phone]  Fax: [phone] / [address] [address] PHYSICAL THERAPY SERVICES  Patient Name: Chito Antoine : 1990   Medical   Diagnosis: Neck pain [M54.2] Treatment Diagnosis: C/S disc derangment   Onset Date: 20     Referral Source: Donnie Orourke MD [facility]): 3/5/2020   Prior Hospitalization: See medical history Provider #: [id number]   Prior Level of Function: Pain free ADLs   Comorbidities: Alcohol use, HBP, Tobacco use   Medications: Verified on Patient Summary List   The Plan of Care and following information is based on the information from the initial evaluation.   ==============================================================================  Assessment / key information:   Chito Antoine is a 26 y.o. male with a chief c/o intermittent neck pain, up to 7/10, increased with movement. His MRI shows disc bulging at C4-5 and C5-6, with slight cord impingement. He reports a recent ADRIANNE helped reduce papin by 50%. He denies any UE radic symptoms. He has tenderness with PA pressure to C4-5, and C5-6. His C/S AROM: flex = 55 degrees, Ext = 60 degrees, R/L lat flex = 28/32 degrees, R/L rotation = 45/45 degrees, all with post neck pain. Pt shows extension bias. Pt instructed on correct posture and extension based HEP. FOTO score = 54.   The patient would benefit from skilled physical therapy at this time.  ===========================================================================================  Eval Complexity: History LOW Complexity : Zero comorbidities / personal factors that will impact the outcome / POC;  Examination  HIGH Complexity : 4+ Standardized tests and measures addressing body structure, function, activity limitation and / or participation in recreation ; Presentation LOW Complexity : Stable, uncomplicated ;  Decision Making MEDIUM Complexity : FOTO score of 26-74; Overall Complexity LOW   Problem List: pain affecting function, decrease ROM, decrease ADL/ functional abilitiies, decrease activity tolerance and decrease flexibility/ joint mobility   Treatment Plan may include any combination of the following: Therapeutic exercise, Therapeutic activities, Neuromuscular re-education, Physical agent/modality, Manual therapy and Patient education  Patient / Family readiness to learn indicated by: asking questions, trying to perform skills and interest  Persons(s) to be included in education: patient (P)  Barriers to Learning/Limitations: None  Measures taken, if barriers to learning:    Patient Goal (s): \"for my pain to be totally gone\"   Patient self reported health status: excellent  Rehabilitation Potential: good   Short Term Goals: To be accomplished in  2  weeks:  1. Pt able to reduce neck pain with ext based HEP  2. Decrease max pain to < 4/10   Long Term Goals: To be accomplished in  4-5  weeks:  1. All ADL's without neck pain  2. Increase FOTO score to > 65, to indicate increased function  3. Full functional C/S AROM without provocation of symptoms. 4.  Patient Independent with HEP/selfcare  Frequency / Duration:   Patient to be seen 2-3  times per week for 4-8  weeks:  Patient / Caregiver education and instruction: self care, activity modification and exercises  Therapist Signature: June Nascimento PT Date: 0/9/9903   Certification Period: NA Time: 4:50 PM   ===========================================================================================  I certify that the above Physical Therapy Services are being furnished while the patient is under my care. I agree with the treatment plan and certify that this therapy is necessary. Physician Signature:        Date:       Time:     Please sign and return to [facility] or you may fax the signed copy to [phone]. Thank you.

## 2020-03-05 NOTE — PROGRESS NOTES
PHYSICAL THERAPY - DAILY TREATMENT NOTE    Patient Name: Donny Scriver        Date: 3/5/2020  : 1990   YES Patient  Verified  Visit #:     Insurance: Payor: Marline Raimundo / Plan: [facility] PPO / Product Type: PPO /      In time: 4:10 Out time: 4:50   Total Treatment Time: 40     Medicare Time Tracking (below)   Total Timed Codes (min):  10 1:1 Treatment Time:  10     TREATMENT AREA =  C/S    SUBJECTIVE    Pain Level (on 0 to 10 scale):  3  / 10   Medication Changes/New allergies or changes in medical history, any new surgeries or procedures?     NO    If yes, update Summary List   Subjective Functional Status/Changes:  []  No changes reported     See eval          OBJECTIVE  Modalities Rationale:     decrease edema, decrease inflammation and decrease pain to improve patient's ability to perform ADLs without pain   min [] Estim, type/location:                                      []  att     []  unatt     []  w/US     []  w/ice    []  w/heat    min []  Mechanical Traction: type/lbs                   []  pro   []  sup   []  int   []  cont    []  before manual    []  after manual    min []  Ultrasound, settings/location:      min []  Iontophoresis w/ dexamethasone, location:                                               []  take home patch       []  in clinic   10 min [x]  Ice     []  Heat    location/position:     min []  Vasopneumatic Device, press/temp:     min []  Other:    [x] Skin assessment post-treatment (if applicable):    [x]  intact    []  redness- no adverse reaction     []redness  adverse reaction:          10 min Manual Therapy:    Rationale:      decrease pain, increase ROM and increase tissue extensibility to improve patient's ability to perform ADLs without pain    Billed With/As:   [x] TE   [] TA   [] Neuro   [] Self Care Patient Education: [x] Review HEP    [] Progressed/Changed HEP based on:   [] positioning   [] body mechanics   [] transfers   [] heat/ice application    [] other:       min Patient Education:  YES  Reviewed HEP   []  Progressed/Changed HEP based on:         Other Objective/Functional Measures:    See eval     Post Treatment Pain Level (on 0 to 10) scale:   1  / 10     ASSESSMENT    []  See Progress Note/Recertification   Patient will continue to benefit from skilled therapy to address remaining functional deficits: see eval   Progress toward goals / Updated goals:    -     PLAN    [x]  Upgrade activities as tolerated YES Continue plan of care   []  Discharge due to :    []  Other:      Therapist: Raman Burton PT    Date: 3/5/2020 Time: 8:36 AM

## 2020-03-09 ENCOUNTER — HOSPITAL ENCOUNTER (OUTPATIENT)
Dept: PHYSICAL THERAPY | Age: 30
Discharge: HOME | End: 2020-03-09
Payer: COMMERCIAL

## 2020-03-09 PROCEDURE — 97110 THERAPEUTIC EXERCISES: CPT

## 2020-03-09 PROCEDURE — 97140 MANUAL THERAPY 1/> REGIONS: CPT

## 2020-03-11 ENCOUNTER — HOSPITAL ENCOUNTER (OUTPATIENT)
Dept: PHYSICAL THERAPY | Age: 30
Discharge: HOME | End: 2020-03-11
Payer: COMMERCIAL

## 2020-03-11 PROCEDURE — 97140 MANUAL THERAPY 1/> REGIONS: CPT

## 2020-03-11 PROCEDURE — 97110 THERAPEUTIC EXERCISES: CPT

## 2020-03-11 NOTE — PROGRESS NOTES
PHYSICAL THERAPY - DAILY TREATMENT NOTE     Patient Name: Lay Lemos        Date: 3/11/2020  : 1990   YES Patient  Verified  Visit #:   3  of   8  Insurance: Payor: Nubia Julien / Plan: [facility] PPO / Product Type: PPO /      In time: 6 Out time: 7   Total Treatment Time: 60     Medicare/[facility] Time Tracking (below)   Total Timed Codes (min):  50 1:1 Treatment Time:  40     TREATMENT AREA =  Neck pain [M54.2]    SUBJECTIVE    Pain Level (on 0 to 10 scale): 4  / 10   Medication Changes/New allergies or changes in medical history, any new surgeries or procedures? NO    If yes, update Summary List   Subjective Functional Status/Changes:  []  No changes reported       Functional improvements: Pt reports he has less pain overall with starting back to therapy.    Functional impairments: Pain with ADLs, work         OBJECTIVE  Modalities Rationale:     decrease pain to improve patient's ability to perform ADLs, IADLs with less pain        min [] Estim, type/location:                                      []  att     []  unatt     []  w/US     []  w/ice    []  w/heat    min []  Mechanical Traction: type/lbs                   []  pro   []  sup   []  int   []  cont    []  before manual    []  after manual    min []  Ultrasound, settings/location:      min []  Iontophoresis w/ dexamethasone, location:                                               []  take home patch       []  in clinic   10 min [x]  Ice     []  Heat    location/position:     min []  Vasopneumatic Device, press/temp:     min []  Other:    [] Skin assessment post-treatment (if applicable):    []  intact    []  redness- no adverse reaction     []redness  adverse reaction:      35/25 min Therapeutic Exercise:  [x]  See flow sheet   Rationale:      increase ROM and increase strength to improve the patients ability   to perform ADLs, IADLs with less pain         15 min Manual Therapy: Technique:      [x] S/DTM []IASTM []PROM [x] Passive Stretching   []manual TPR    [x]Jt manipulation:Gr I [] II []  III [] IV[] V[]  Treatment Area:  B UT, levator stretching, SOR, MONA, cervical ext and retraction with OP   Rationale:      decrease pain, increase ROM, increase tissue extensibility and decrease trigger points   to improve patient's ability to perform ADLs, IADLs with less pain        Billed With/As:   [x] TE   [] TA   [] Neuro   [] Self Care Patient Education: [x] Review HEP    [] Progressed/Changed HEP based on:   [x] positioning   [] body mechanics   [] transfers   [] heat/ice application    [] other:        Other Objective/Functional Measures:  Advanced there ex for stretching and strengthening. Post Treatment Pain Level (on 0 to 10) scale:   2  / 10     ASSESSMENT    Assessment/Changes in Function:     Pt with reduced pain post tx.     []  See Progress Note/Recertification   Patient will continue to benefit from skilled PT services to address ROM deficits, address strength deficits, analyze and address soft tissue restrictions and analyze and cue movement patterns to attain remaining goals. Progress toward goals / Updated goals:    1.   Pt able to reduce neck pain with ext based HEP--2/10 slow progress       PLAN    []  Upgrade activities as tolerated YES Continue plan of care   []  Discharge due to :    []  Other:      Therapist: Deena Todd PT    Date: 3/11/2020 Time: 4 PM     Future Appointments   Date Time Provider Jeannine Garcia   3/11/2020  6:00 PM Manish Rosado [facility]   3/17/2020  5:00 PM Becky Richardson PT [facility] AT [facility]   3/19/2020  5:00 PM David Ortiz PT [facility] AT [facility]   3/23/2020  5:00 PM Joe Alvarado PT [facility] AT [facility]   3/26/2020  5:30 PM David Ortiz PT [facility] AT [facility]   3/30/2020  4:30 PM Joe Alvarado PT [facility] AT [facility]   4/1/2020  4:30 PM Joe Alvarado PT [facility] AT [facility]

## 2020-03-11 NOTE — PROGRESS NOTES
PHYSICAL THERAPY - DAILY TREATMENT NOTE     Patient Name: Hilario Warren        Date: 3/11/2020  : 1990   YES Patient  Verified  Visit #:   2   of   8  Insurance: Payor: BLUE CROSS / Plan: [facility] PPO / Product Type: PPO /      In time: 6 Out time: 7   Total Treatment Time: 60     Medicare/[facility] Time Tracking (below)   Total Timed Codes (min):  50 1:1 Treatment Time:  40     TREATMENT AREA =  Neck pain [M54.2]    SUBJECTIVE    Pain Level (on 0 to 10 scale):  5  / 10   Medication Changes/New allergies or changes in medical history, any new surgeries or procedures?     NO    If yes, update Summary List   Subjective Functional Status/Changes:  []  No changes reported       Functional improvements: n/a  Functional impairments: Felt more pain with HEP from first day, had to modify it         OBJECTIVE  Modalities Rationale:     decrease inflammation and decrease pain to improve patient's ability to perform ADLs, IADLs with less pain        min [] Estim, type/location:                                      []  att     []  unatt     []  w/US     []  w/ice    []  w/heat    min []  Mechanical Traction: type/lbs                   []  pro   []  sup   []  int   []  cont    []  before manual    []  after manual    min []  Ultrasound, settings/location:      min []  Iontophoresis w/ dexamethasone, location:                                               []  take home patch       []  in clinic   10 min [x]  Ice     []  Heat    location/position: Supine to cervical    min []  Vasopneumatic Device, press/temp:     min []  Other:    [x] Skin assessment post-treatment (if applicable):    [x]  intact    [x]  redness- no adverse reaction     []redness  adverse reaction:      35/25 min Therapeutic Exercise:  [x]  See flow sheet   Rationale:      increase ROM and increase strength to improve the patients ability to perform ADLs, IADLs with less pain            15 min Manual Therapy: Technique:      [x] S/DTM []IASTM []PROM [] Passive Stretching   []manual TPR    []Jt manipulation:Gr I [] II []  III [] IV[] V[]  Treatment Area:  Cervical ext, retraction, Manual stretching, STM to B UT   Rationale:      decrease pain and increase ROM to improve patient's ability to perform ADLs, IADLs with less pain         Billed With/As:   [x] TE   [] TA   [] Neuro   [] Self Care Patient Education: [x] Review HEP    [] Progressed/Changed HEP based on:   [] positioning   [] body mechanics   [] transfers   [] heat/ice application    [] other:        Other Objective/Functional Measures:    Pt with initiation of there ex for strengthening, stretching f/b manual tx to improve ROM, pain. Post Treatment Pain Level (on 0 to 10) scale:   2 / 10     ASSESSMENT    Assessment/Changes in Function:     Pt with limited progress noted secondary to exacerbation with cervical ROM for HEP. Modified to reduce pain. []  See Progress Note/Recertification   Patient will continue to benefit from skilled PT services to modify and progress therapeutic interventions, address functional mobility deficits, address ROM deficits and address strength deficits to attain remaining goals. Progress toward goals / Updated goals:    Pt with limited progress toward newly established goals.      PLAN    [x]  Upgrade activities as tolerated YES Continue plan of care   []  Discharge due to :    []  Other:      Therapist: Laurie Parrish PT    Date: 3/11/2020 Time: 1:15 PM     Future Appointments   Date Time Provider Jeannine Garcia   3/11/2020  6:00 PM Yanique Kirk [place] [facility]   3/17/2020  5:00 PM Yanique Kirk PT [facility] AT [facility]   3/19/2020  5:00 PM Silver Leone PT RE[facility] AT [facility]   3/23/2020  5:00 PM Judy Conti PT [facility] AT [facility]   3/26/2020  5:30 PM Silver Leone PT [facility] AT [facility]   3/30/2020  4:30 PM Judy Conti PT [facility] AT [facility]   4/1/2020  4:30 PM Judy Conti PT RE[facility] AT [facility]

## 2020-03-17 ENCOUNTER — HOSPITAL ENCOUNTER (OUTPATIENT)
Dept: PHYSICAL THERAPY | Age: 30
Discharge: HOME | End: 2020-03-17
Payer: COMMERCIAL

## 2020-03-17 PROCEDURE — 97110 THERAPEUTIC EXERCISES: CPT

## 2020-03-17 PROCEDURE — 97140 MANUAL THERAPY 1/> REGIONS: CPT

## 2020-03-17 NOTE — PROGRESS NOTES
PHYSICAL THERAPY - DAILY TREATMENT NOTE     Patient Name: Marshal Escoto        Date: 3/17/2020  : 1990   YES Patient  Verified  Visit #:  4  of   8  Insurance: Payor: [facility] / Plan: [facility] PPO / Product Type: PPO /      In time: 535 Out time: 525   Total Treatment Time: 55     Medicare/[facility] Time Tracking (below)   Total Timed Codes (min):  45 1:1 Treatment Time:  40     TREATMENT AREA =  Neck pain [M54.2]    SUBJECTIVE    Pain Level (on 0 to 10 scale): 2  / 10   Medication Changes/New allergies or changes in medical history, any new surgeries or procedures? NO    If yes, update Summary List   Subjective Functional Status/Changes:  []  No changes reported     Pt reports he is feeling better with less extension. Noting he is doing cervical retraction and feeling improvement.   Denies N and T into B UE.         OBJECTIVE  Modalities Rationale:     decrease pain to improve patient's ability to perform ADLs, IADLs with less pain   min [] Estim, type/location:                                      []  att     []  unatt     []  w/US     []  w/ice    []  w/heat    min []  Mechanical Traction: type/lbs                   []  pro   []  sup   []  int   []  cont    []  before manual    []  after manual    min []  Ultrasound, settings/location:      min []  Iontophoresis w/ dexamethasone, location:                                               []  take home patch       []  in clinic   10 min [x]  Ice     []  Heat    location/position: Supine to cervical    min []  Vasopneumatic Device, press/temp:     min []  Other:    [x] Skin assessment post-treatment (if applicable):    [x]  intact    []  redness- no adverse reaction     []redness  adverse reaction:      35/25 min Therapeutic Exercise:  [x]  See flow sheet   Rationale:      increase ROM and increase strength to improve the patients ability   to perform rotation B and cervical ext with less pain         15 min Manual Therapy: Technique: [x] S/DTM []IASTM []PROM [x] Passive Stretching   []manual TPR    [x]Jt manipulation:Gr I [x] II [x]  III [x] IV[x] V[]  Treatment Area:  Cervical rotation downgliding, extension with thoracic rotation stretching, PA mobility   Rationale:      decrease pain, increase ROM, increase tissue extensibility and decrease trigger points   to improve patient's ability to perform ADLs, IADLs with less pain        Billed With/As:   [x] TE   [] TA   [] Neuro   [] Self Care Patient Education: [x] Review HEP    [] Progressed/Changed HEP based on:   [x] positioning   [] body mechanics   [] transfers   [] heat/ice application    [] other:        Other Objective/Functional Measures: Added thoracic paraspinals release, PA mobility   Post Treatment Pain Level (on 0 to 10) scale:   2  / 10     ASSESSMENT    Assessment/Changes in Function:     Pt with reduced pain post tx and rotation R improved. []  See Progress Note/Recertification   Patient will continue to benefit from skilled PT services to modify and progress therapeutic interventions, address functional mobility deficits, address ROM deficits, address strength deficits, analyze and address soft tissue restrictions and analyze and cue movement patterns to attain remaining goals. Progress toward goals / Updated goals:    1.   Pt able to reduce neck pain with ext based HEP--1/10 good progress       PLAN    [x]  Upgrade activities as tolerated YES Continue plan of care   []  Discharge due to :    []  Other:      Therapist: Laurie Parrish PT    Date: 3/17/2020 Time: 6 PM     Future Appointments   Date Time Provider Jeannine Garcia   3/17/2020  5:30 PM Yanique Kirk PT [facility] AT [facility]   3/19/2020  5:00 PM Yanique Kirk PT [facility]   3/23/2020  5:00 PM Judy Conti PT [facility] AT [facility]   3/26/2020  5:30 PM Silver Leone PT [facility]   3/30/2020  4:30 PM Judy Conti PT [facility] AT [facility]   4/1/2020  4:30 PM Judy Conti PT [facility] AT [facility]

## 2020-03-19 ENCOUNTER — HOSPITAL ENCOUNTER (OUTPATIENT)
Dept: PHYSICAL THERAPY | Age: 30
Discharge: HOME | End: 2020-03-19
Payer: COMMERCIAL

## 2020-03-19 PROCEDURE — 97110 THERAPEUTIC EXERCISES: CPT

## 2020-03-19 PROCEDURE — 97140 MANUAL THERAPY 1/> REGIONS: CPT

## 2020-03-23 ENCOUNTER — HOSPITAL ENCOUNTER (OUTPATIENT)
Dept: PHYSICAL THERAPY | Age: 30
Discharge: HOME | End: 2020-03-23
Payer: COMMERCIAL

## 2020-03-23 PROCEDURE — 97140 MANUAL THERAPY 1/> REGIONS: CPT

## 2020-03-23 PROCEDURE — 97110 THERAPEUTIC EXERCISES: CPT

## 2020-03-23 NOTE — PROGRESS NOTES
PHYSICAL THERAPY - DAILY TREATMENT NOTE     Patient Name: Latisha Carver        Date: 3/23/2020  : 1990   YES Patient  Verified  Visit #:   6   of   8  Insurance: Payor: Heidy Laws / Plan: [facility] PPO / Product Type: PPO /      In time: 510 Out time: 600   Total Treatment Time: 50     Medicare/[employer] Time Tracking (below)   Total Timed Codes (min):  40 1:1 Treatment Time:  40     TREATMENT AREA =  Neck pain [M54.2]    SUBJECTIVE    Pain Level (on 0 to 10 scale):    / 10   Medication Changes/New allergies or changes in medical history, any new surgeries or procedures? NO    If yes, update Summary List   Subjective Functional Status/Changes:  []  No changes reported   Increased pain from lifting heavy clothes. Pain in central neck area.              OBJECTIVE  Modalities Rationale:     decrease inflammation and decrease pain to improve patient's ability to perfomr ADLs      min [] Estim, type/location:                                      []  att     []  unatt     []  w/US     []  w/ice    []  w/heat    min []  Mechanical Traction: type/lbs                   []  pro   []  sup   []  int   []  cont    []  before manual    []  after manual    min []  Ultrasound, settings/location:      min []  Iontophoresis w/ dexamethasone, location:                                               []  take home patch       []  in clinic   10 min [x]  Ice     []  Heat    location/position:     min []  Vasopneumatic Device, press/temp:     min []  Other:    [] Skin assessment post-treatment (if applicable):    []  intact    []  redness- no adverse reaction     []redness  adverse reaction:      20 min Manual Therapy: Technique:      [x] S/DTM []IASTM []PROM [x] Passive Stretching   [x]manual TPR  [] SOR [] man traction  [x]Jt manipulation:Gr I [] II []  III [] IV[x] - PA upper thor  [] OP with REIL    []   Treatment Area:  CS/ upper thor   Rationale:      decrease pain, increase ROM, increase tissue extensibility and decrease trigger points to improve patient's ability to perform ADLs with less pain    20 min Therapeutic Exercise:  [x]  See flow sheet   Rationale:      increase ROM, increase strength and improve posture to improve the patients ability to perform ADLs with less pain         Billed With/As:   [] TE   [] TA   [] Neuro   [] Self Care Patient Education: [x] Review HEP    [] Progressed/Changed HEP based on:   [] positioning   [] body mechanics   [] transfers   [] heat/ice application    [] other:        Other Objective/Functional Measures:    Near full Cx rotation- reports tightness turning to the right  Full PROM   Post Treatment Pain Level (on 0 to 10) scale:   1  / 10     ASSESSMENT  Assessment/Changes in Function:        Functional limitation:  reported inc pain from heavy lifting           Patient will continue to benefit from skilled PT services to modify and progress therapeutic interventions, address functional mobility deficits, address ROM deficits, address strength deficits, analyze and address soft tissue restrictions, analyze and cue movement patterns and assess and modify postural abnormalities to attain remaining goals. Progress toward goals / Updated goals: Increased pain this day, but overall improved.   progressing towards LTGs         []  See Progress Note/Recertification    PLAN    [x]  Upgrade activities as tolerated {YES) Continue plan of care   []  Discharge due to :    []  Other:      Therapist: Osmar Caldera PT    Date: 3/23/2020 Time: 5:09 PM     Future Appointments   Date Time Provider Jeannine Garcia   3/26/2020  5:30 PM Estee [facility]   3/30/2020  4:30 PM Amara Rose, PT [facility] AT [facility]   4/1/2020  4:30 PM Amara Rose, PT [facility] AT [facility]

## 2020-03-26 ENCOUNTER — APPOINTMENT (OUTPATIENT)
Dept: PHYSICAL THERAPY | Age: 30
End: 2020-03-26
Payer: COMMERCIAL

## 2020-03-30 ENCOUNTER — APPOINTMENT (OUTPATIENT)
Dept: PHYSICAL THERAPY | Age: 30
End: 2020-03-30
Payer: COMMERCIAL

## 2020-03-30 NOTE — PROGRESS NOTES
[facility] PHYSICAL THERAPY AT [facility] [address], [address]  Phone: [phone]  Fax: [phone]  PROGRESS NOTE  Patient Name: Amparo Perea : 1990   Treatment/Medical Diagnosis: Neck pain [M54.2]   Referral Source: Ara Yan MD     Date of Initial Visit: 3/5/20 Attended Visits: 6 Missed Visits:      SUMMARY OF TREATMENT  PT consisted of manual therapy, therapeutic exercise, modalities for pain management and patient education of HEP/ activity modification/ posture  CURRENT STATUS  Patient last seen 3/23/20. He reported overall improvement in symptoms, but had an increase in pain intensity due to heavier lifting. Cx ROM was near full, with less pain turning to the right. Pt temporarily placed on hold due to the nationwide concerns over COVID-19. Pt issued HEP and therapy staff will continue to contact pt every 1-2 weeks via phone to monitor progress. Plan to resume physical therapy once concerns improve. Thank you. If you have any questions/comments please contact us directly at [phone]. Thank you for allowing us to assist in the care of your patient. Therapist Signature: Daniel Ashton PT Date: 3/30/2020   Reporting Period:  Certification Period:    Time: 11:29 AM   NOTE TO PHYSICIAN:  PLEASE COMPLETE THE ORDERS BELOW AND FAX TO   [facility] at [address]: [phone]. If you are unable to process this request in 24 hours please contact our office: [phone].    ___ I have read the above report and request that my patient continue as recommended.   ___ I have read the above report and request that my patient continue therapy with the following changes/special instructions:_________________________________________   ___ I have read the above report and request that my patient be discharged from therapy.      Physician Signature:        Date:       Time:

## 2020-04-01 ENCOUNTER — APPOINTMENT (OUTPATIENT)
Dept: PHYSICAL THERAPY | Age: 30
End: 2020-04-01

## 2020-07-01 ENCOUNTER — HOSPITAL ENCOUNTER (OUTPATIENT)
Dept: PHYSICAL THERAPY | Age: 30
Discharge: HOME | End: 2020-07-01
Payer: COMMERCIAL

## 2020-07-01 ENCOUNTER — APPOINTMENT (OUTPATIENT)
Dept: PHYSICAL THERAPY | Age: 30
End: 2020-07-01

## 2020-07-01 PROCEDURE — 97140 MANUAL THERAPY 1/> REGIONS: CPT

## 2020-07-01 PROCEDURE — 97110 THERAPEUTIC EXERCISES: CPT

## 2020-07-01 NOTE — PROGRESS NOTES
PHYSICAL THERAPY - DAILY TREATMENT NOTE     Patient Name: Quentin Scott        Date: 2020  : 1990   YES Patient  Verified  Visit #:     Insurance: Payor: BLUE CROSS / Plan: [facility] PPO / Product Type: PPO /      In time: 205 Out time: 300   Total Treatment Time: 55     Medicare/[facility] Time Tracking (below)   Total Timed Codes (min):  45 1:1 Treatment Time:  40     TREATMENT AREA =  Neck pain [M54.2]    SUBJECTIVE    Pain Level (on 0 to 10 scale):  0-4  / 10   Medication Changes/New allergies or changes in medical history, any new surgeries or procedures?     NO    If yes, update Summary List   Subjective Functional Status/Changes:  []  No changes reported   See PN           OBJECTIVE  Modalities Rationale:     decrease pain to improve patient's ability to turn head/ look up      min [] Estim, type/location:                                      []  att     []  unatt     []  w/US     []  w/ice    []  w/heat    min []  Mechanical Traction: type/lbs                   []  pro   []  sup   []  int   []  cont    []  before manual    []  after manual    min []  Ultrasound, settings/location:      min []  Iontophoresis w/ dexamethasone, location:                                               []  take home patch       []  in clinic   10 min []  Ice     []  Heat    location/position:     min []  Vasopneumatic Device, press/temp:     min []  Other:    [x] Skin assessment post-treatment (if applicable):    [x]  intact    [x]  redness- no adverse reaction     []redness  adverse reaction:      20 min Manual Therapy: Technique:      [x] S/DTM []IASTM []PROM [x] Passive Stretching   [x]manual TPR  [] SOR [] man traction  []Jt manipulation:Gr I [] II []  III [] IV[]   [] OP with REIL    []   Treatment Area:  CS   Rationale:      decrease pain, increase ROM, increase tissue extensibility and decrease trigger points to improve patient's ability to turn head/ look up    20/ 25 min Therapeutic Exercise:  [x]  See flow sheet   Rationale:      increase ROM and increase strength to improve the patients ability to perform ADLs with less pain       Billed With/As:   [x] TE   [] TA   [] Neuro   [] Self Care Patient Education: [x] Review HEP    [] Progressed/Changed HEP based on:   [] positioning   [] body mechanics   [] transfers   [] heat/ice application    [] other:        Other Objective/Functional Measures:    See PN   Post Treatment Pain Level (on 0 to 10) scale:   2  / 10     ASSESSMENT    [x]  See Progress Note/Recertification    PLAN    [x]  Upgrade activities as tolerated {YES) Continue plan of care   []  Discharge due to :    []  Other:      Therapist: Mike Foster PT    Date: 7/1/2020 Time: 2:18 PM   No future appointments.

## 2020-07-01 NOTE — PROGRESS NOTES
[address] PHYSICAL THERAPY AT [address], [address] [place] [address], [address], [address] [place] [address] - Phone: [phone]  Fax: [phone]  PROGRESS NOTE  Patient Name: Omar Hopper : 1990   Treatment/Medical Diagnosis: Neck pain [M54.2]   Referral Source: Darl Anon, MD Tacey Runner, MD     Date of Initial Visit: 3/5/20 Attended Visits: 7 total (6 prior to hold) Missed Visits:        Omar Hopper is returning to physical therapy after prolonged absence due to COVID-19 restrictions. Current status is as follows:    CURRENT STATUS  Patient reports persistent tightness with turning the head either direction and end range pain. He denies UE radicular pain. Treatment Plan may include any combination of the following: Therapeutic exercise, Therapeutic activities, Neuromuscular re-education, Physical agent/modality, Manual therapy and Patient education     Previous Goals:  1. All ADL's without neck pain  2. Increase FOTO score to > 65, to indicate increased function  3. Full functional C/S AROM without provocation of symptoms.    4.  Patient Independent with HEP/selfcare     Prior Level/Current Level:  1) Prior Level: pain with ADLs, ranging from 2-7/10   Current Level: pain with ADLs,  ranges from 0-4/10   Goal Met? no  2) Prior Level: 54   Current Level: 60   Goal Met? progressing  3) Prior Level: C/S AROM: flex = 55 degrees, Ext = 60 degrees, R/L lat flex = 28/32                    degrees, R/L rotation = 45/45 degrees, all with post neck pain   Current Level: CS AROM: flex= 54, ext= 30, R/L rot= 50/57, pain primarily with             extension and R rot   Goal Met? no  4) Prior Level: na   Current Level: partially compliant   Goal Met? no    New Goals to be achieved in __4__  weeks:  Continue LTGs as stated above    RECOMMENDATIONS  Resume PT per previous POC @ 2-3x/week for 4 weeks    If you have any questions/comments please contact us directly at [phone]. Thank you for allowing us to assist in the care of your patient. Therapist Signature: Belkis Li PT Date: 7/1/2020   Reporting Period:  Certification Period:    Time: 1:28 PM   NOTE TO PHYSICIAN:  PLEASE COMPLETE THE ORDERS BELOW AND FAX TO [facility] AT [facility]. Chante Whitehead Fax: [phone]    If you are unable to process t his request in 24 hours please contact our office: [phone].    ___ I have read the above report and request that my patient continue as recommended.   ___ I have read the above report and request that my patient continue therapy with the following changes/special instructions:_________________________________________   ___ I have read the above report and request that my patient be discharged from therapy.      Physician Signature:        Date:       Time:

## 2020-07-08 ENCOUNTER — HOSPITAL ENCOUNTER (OUTPATIENT)
Dept: PHYSICAL THERAPY | Age: 30
Discharge: HOME | End: 2020-07-08
Payer: COMMERCIAL

## 2020-07-08 PROCEDURE — 97140 MANUAL THERAPY 1/> REGIONS: CPT

## 2020-07-08 PROCEDURE — 97110 THERAPEUTIC EXERCISES: CPT

## 2020-07-08 NOTE — PROGRESS NOTES
PHYSICAL THERAPY - DAILY TREATMENT NOTE    Patient Name: Sivakumar Salazar        Date: 2020  : 1990    Patient  Verified: YES  Visit #:     Insurance: Payor: Zoraida Castellon / Plan: [facility] PPO / Product Type: PPO /      In time: 12:35 Out time: 1:20   Total Treatment Time: 45     Medicare Time Tracking (below)   Total Timed Codes (min):  - 1:1 Treatment Time:  -     TREATMENT AREA/ DIAGNOSIS = Neck pain [M54.2]    SUBJECTIVE  Pain Level (on 0 to 10 scale):  3  / 10   Medication Changes/New allergies or changes in medical history, any new surgeries or procedures? NO    If yes, update Summary List   Subjective Functional Status/Changes:  []  No changes reported     Pt reports pain with looking right and extension      OBJECTIVE  Modalities Rationale:     decrease inflammation and decrease pain to improve patient's ability to perform ADLs without pain     min [] Estim, type/location:                                      []  att     []  unatt     []  w/US     []  w/ice    []  w/heat    min []  Mechanical Traction: type/lbs                   []  pro   []  sup   []  int   []  cont    []  before manual    []  after manual    min []  Ultrasound, settings/location:      min []  Iontophoresis w/ dexamethasone, location:                                               []  take home patch       []  in clinic    min []  Ice     []  Heat    location/position:     min []  Vasopneumatic Device, press/temp:     min []  Other:    [] Skin assessment post-treatment (if applicable):    []  intact    []  redness- no adverse reaction     []redness  adverse reaction:        20 min Therapeutic Exercise:  [x]  See flow sheet   Rationale:      increase strength and improve coordination to improve the patients ability to perform ADLs without pain       15 min Manual Therapy: STM to c/s.  R side downglides to c/s   Rationale:      increase ROM and increase tissue extensibility to improve patient's ability to perform ADLs without pain    Billed With/As:   [x] TE   [] TA   [] Neuro   [] Self Care Patient Education: [x] Review HEP    [] Progressed/Changed HEP based on:   [] positioning   [] body mechanics   [] transfers   [] heat/ice application    [] other:        Other Objective/Functional Measures:    Right side neck pain with right rotation and extension combine, possible facet arthropathy    Post Treatment Pain Level (on 0 to 10) scale:   3  / 10     ASSESSMENT  Assessment/Changes in Function:     Pt showed good activity tolerance with exercises       []  See Progress Note/Recertification   Patient will continue to benefit from skilled PT services to modify and progress therapeutic interventions, address functional mobility deficits, address ROM deficits and address strength deficits to attain remaining goals.    Progress toward goals / Updated goals:    Good Progress to    [] STG    [x] LTG  1 as shown by good mobility     PLAN  [x]  Upgrade activities as tolerated YES Continue plan of care   []  Discharge due to :    []  Other:      Therapist: Anthony Carver DPT     Date: 7/8/2020 Time: 3:42 PM        Future Appointments   Date Time Provider Jeannine Garcia   7/13/2020  1:30 PM Fady Fernandez [facility]   7/15/2020 12:30 PM Manuela Bold [facility]   7/20/2020  1:30 PM Manuela Bold [facility]   7/22/2020  1:45 PM Filiberto Gonzalez PT [facility]   7/27/2020 12:15 PM Manuela Bold [facility]   7/29/2020 12:30 PM Manuela Bold [facility]

## 2020-07-10 ENCOUNTER — APPOINTMENT (OUTPATIENT)
Dept: PHYSICAL THERAPY | Age: 30
End: 2020-07-10
Payer: COMMERCIAL

## 2020-07-13 ENCOUNTER — APPOINTMENT (OUTPATIENT)
Dept: PHYSICAL THERAPY | Age: 30
End: 2020-07-13
Payer: COMMERCIAL

## 2020-07-15 ENCOUNTER — HOSPITAL ENCOUNTER (OUTPATIENT)
Dept: PHYSICAL THERAPY | Age: 30
Discharge: HOME | End: 2020-07-15
Payer: COMMERCIAL

## 2020-07-15 PROCEDURE — 97110 THERAPEUTIC EXERCISES: CPT

## 2020-07-15 PROCEDURE — 97140 MANUAL THERAPY 1/> REGIONS: CPT

## 2020-07-15 NOTE — PROGRESS NOTES
PHYSICAL THERAPY - DAILY TREATMENT NOTE    Patient Name: Oamr Hopper        Date: 7/15/2020  : 1990    Patient  Verified: YES  Visit #:     Insurance: Payor: Cristel Bailey / Plan: [facility] PPO / Product Type: PPO /      In time: 12:35 Out time: 1:15   Total Treatment Time: 40     Medicare Time Tracking (below)   Total Timed Codes (min):  - 1:1 Treatment Time:  -     TREATMENT AREA/ DIAGNOSIS = Neck pain [M54.2]    SUBJECTIVE  Pain Level (on 0 to 10 scale):  2  / 10   Medication Changes/New allergies or changes in medical history, any new surgeries or procedures? NO    If yes, update Summary List   Subjective Functional Status/Changes:  []  No changes reported     Pt reports stiffness on right side when turning right and also looking up in lower cervical location      OBJECTIVE  Modalities Rationale:     decrease inflammation and decrease pain to improve patient's ability to perform ADLs without pain     min [] Estim, type/location:                                      []  att     []  unatt     []  w/US     []  w/ice    []  w/heat    min []  Mechanical Traction: type/lbs                   []  pro   []  sup   []  int   []  cont    []  before manual    []  after manual    min []  Ultrasound, settings/location:      min []  Iontophoresis w/ dexamethasone, location:                                               []  take home patch       []  in clinic   10 min [x]  Ice     []  Heat    location/position: c/s    min []  Vasopneumatic Device, press/temp:     min []  Other:    [] Skin assessment post-treatment (if applicable):    []  intact    []  redness- no adverse reaction     []redness  adverse reaction:        15 min Therapeutic Exercise:  [x]  See flow sheet   Rationale:      increase strength and improve coordination to improve the patients ability to perform ADLs without pain       15 min Manual Therapy: STM to c/s.  Seated cervicothoracic junction grade 5 mobilization upward thrust. c/s downglides to R   Rationale:      increase ROM and increase tissue extensibility to improve patient's ability to perform ADLs without pain    Billed With/As:   [x] TE   [] TA   [] Neuro   [] Self Care Patient Education: [x] Review HEP    [] Progressed/Changed HEP based on:   [] positioning   [] body mechanics   [] transfers   [] heat/ice application    [] other:        Other Objective/Functional Measures:    Decreased mobility of upper thoracic and lower thoracic cervical vertebrae. Multiple cavitations which upward thrust mobilization. Post Treatment Pain Level (on 0 to 10) scale:   0  / 10     ASSESSMENT  Assessment/Changes in Function:     Pt demonstrated improved pain free R rotation after C/T mobilization. Due to coupled movements being pain provoking, right sided facet arthropathy may be possible.      []  See Progress Note/Recertification   Patient will continue to benefit from skilled PT services to modify and progress therapeutic interventions, address functional mobility deficits, address ROM deficits, address strength deficits, analyze and address soft tissue restrictions and analyze and cue movement patterns to attain remaining goals.    Progress toward goals / Updated goals:    Good Progress to    [] STG    [x] LTG  1 as shown by improved pain free mobility after treatment session     PLAN  [x]  Upgrade activities as tolerated YES Continue plan of care   []  Discharge due to :    []  Other:      Therapist: Sami Schmidt DPT     Date: 7/15/2020 Time: 12:55 PM        Future Appointments   Date Time Provider Jeannine Garcia   7/17/2020 12:30 PM Redd Salomon, PT [facility]   7/20/2020  1:30 PM Sherita Churchman [facility]   7/22/2020  1:45 PM Mekhi Conroy PT [facility]   7/27/2020 12:15 PM Sherita Churchman [facility]   7/29/2020 12:30 PM Sherita Churchman [facility]

## 2020-07-17 ENCOUNTER — HOSPITAL ENCOUNTER (OUTPATIENT)
Dept: PHYSICAL THERAPY | Age: 30
Discharge: HOME | End: 2020-07-17
Payer: COMMERCIAL

## 2020-07-17 PROCEDURE — 97140 MANUAL THERAPY 1/> REGIONS: CPT

## 2020-07-17 PROCEDURE — 97110 THERAPEUTIC EXERCISES: CPT

## 2020-07-17 NOTE — PROGRESS NOTES
PHYSICAL THERAPY - DAILY TREATMENT NOTE    Patient Name: Erika Nava        Date: 2020  : 1990    Patient  Verified: YES  Visit #:   10   of   12  Insurance: Payor: John Llanes / Plan: [facility] PPO / Product Type: PPO /      In time: 12:35 Out time: 1:15   Total Treatment Time: 40     Medicare/[facility] Time Tracking (below)   Total Timed Codes (min):  30 1:1 Treatment Time:  30     TREATMENT AREA/ DIAGNOSIS = Neck pain [M54.2]    SUBJECTIVE  Pain Level (on 0 to 10 scale):  2  / 10   Medication Changes/New allergies or changes in medical history, any new surgeries or procedures? NO    If yes, update Summary List   Subjective Functional Status/Changes:  []  No changes reported     Functional improvement it feels better than last week   Functional limitation it hurts when I turn to the R and look up        OBJECTIVE  Modalities Rationale:     decrease edema, decrease inflammation and decrease pain to improve patient's ability to perform ADLs without pain   min [] Estim, type/location:                                      []  att     []  unatt     []  w/US     []  w/ice    []  w/heat    min []  Mechanical Traction: type/lbs                   []  pro   []  sup   []  int   []  cont    []  before manual    []  after manual    min []  Ultrasound, settings/location:      min []  Iontophoresis w/ dexamethasone, location:                                               []  take home patch       []  in clinic   10 min [x]  Ice     []  Heat    location/position:     min []  Vasopneumatic Device, press/temp:     min []  Other:    [x] Skin assessment post-treatment (if applicable):    [x]  intact    []  redness- no adverse reaction     []redness  adverse reaction:        20 min Manual Therapy: Rotational HLVAT to upper T/S and PA mobs to T/S and C/S, R mid/lower C/S downlgides, passive rotaiton.     Rationale:      decrease pain, increase ROM and increase tissue extensibility to improve patient's ability to perform ADLs without pain    10 min Therapeutic Exercise:  [x]  See flow sheet   Rationale:      increase ROM and increase strength to improve the patients ability to perform ADLs without pain     Billed With/As:   [x] TE   [] TA   [] Neuro   [] Self Care Patient Education: [x] Review HEP    [] Progressed/Changed HEP based on:   [] positioning   [] body mechanics   [] transfers   [] heat/ice application    [] other:        Other Objective/Functional Measures:    Pt with R upper T/S pain with R rotation and extension   Added upper T/S rotational HVLAT  Added press ups and seated spinal stretch for ROM and prone chin yucka nd segmental flexion for stability   Post Treatment Pain Level (on 0 to 10) scale:   0  / 10     ASSESSMENT  Assessment/Changes in Function:     Pain free after treatment     []  See Progress Note/Recertification   Patient will continue to benefit from skilled PT services to modify and progress therapeutic interventions, address functional mobility deficits, address ROM deficits, address strength deficits, analyze and address soft tissue restrictions, analyze and cue movement patterns, analyze and modify body mechanics/ergonomics and assess and modify postural abnormalities to attain remaining goals.    Progress toward goals / Updated goals:    Low pain levels after treatment     PLAN  [x]  Upgrade activities as tolerated YES Continue plan of care   []  Discharge due to :    []  Other:      Therapist: Mavis Amador PT    Date: 7/17/2020 Time: 1:07 PM     Future Appointments   Date Time Provider Jeannine Garcia   7/20/2020  1:30 PM Cecillia Durand [facility]   7/22/2020  1:45 PM Joshua Middleton PT [facility]   7/27/2020 12:15 PM Cecillia Durand [facility]   7/29/2020 12:30 PM Cecillia Durand [facility]

## 2020-07-20 ENCOUNTER — APPOINTMENT (OUTPATIENT)
Dept: PHYSICAL THERAPY | Age: 30
End: 2020-07-20
Payer: COMMERCIAL

## 2020-07-22 ENCOUNTER — HOSPITAL ENCOUNTER (OUTPATIENT)
Dept: PHYSICAL THERAPY | Age: 30
Discharge: HOME | End: 2020-07-22
Payer: COMMERCIAL

## 2020-07-23 ENCOUNTER — HOSPITAL ENCOUNTER (OUTPATIENT)
Dept: PHYSICAL THERAPY | Age: 30
Discharge: HOME | End: 2020-07-23
Payer: COMMERCIAL

## 2020-07-23 PROCEDURE — 97110 THERAPEUTIC EXERCISES: CPT

## 2020-07-23 PROCEDURE — 97140 MANUAL THERAPY 1/> REGIONS: CPT

## 2020-07-23 NOTE — PROGRESS NOTES
PHYSICAL THERAPY - DAILY TREATMENT NOTE    Patient Name: Amor De La Cruz        Date: 2020  : 1990    Patient  Verified: YES  Visit #:     Insurance: Payor: BLUE CROSS / Plan: [facility] PPO / Product Type: PPO /      In time: 3:05 Out time: 3:40   Total Treatment Time: 35     Medicare/BCBS Time Tracking (below)   Total Timed Codes (min):  25 1:1 Treatment Time:  25     TREATMENT AREA/ DIAGNOSIS = Neck pain [M54.2]    SUBJECTIVE  Pain Level (on 0 to 10 scale):  2  / 10   Medication Changes/New allergies or changes in medical history, any new surgeries or procedures?     NO    If yes, update Summary List   Subjective Functional Status/Changes:  []  No changes reported     Functional improvement I have more ROM    Functional limitation I was sore after the last visit        OBJECTIVE  Modalities Rationale:     decrease edema, decrease inflammation and decrease pain to improve patient's ability to perform ADLs without pain   min [] Estim, type/location:                                      []  att     []  unatt     []  w/US     []  w/ice    []  w/heat    min []  Mechanical Traction: type/lbs                   []  pro   []  sup   []  int   []  cont    []  before manual    []  after manual    min []  Ultrasound, settings/location:      min []  Iontophoresis w/ dexamethasone, location:                                               []  take home patch       []  in clinic   10 min [x]  Ice     []  Heat    location/position:     min []  Vasopneumatic Device, press/temp:     min []  Other:    [x] Skin assessment post-treatment (if applicable):    [x]  intact    []  redness- no adverse reaction     []redness  adverse reaction:        10 min Manual Therapy: HVLAT to T/S and C/T junction, B downglides and passive rotation    Rationale:      decrease pain, increase ROM and increase tissue extensibility to improve patient's ability to perform ADLs without pain    15 min Therapeutic Exercise:  [x]  See flow sheet   Rationale:      increase ROM and increase strength to improve the patients ability to perform ADLs without pain     Billed With/As:   [x] TE   [] TA   [] Neuro   [] Self Care Patient Education: [x] Review HEP    [] Progressed/Changed HEP based on:   [] positioning   [] body mechanics   [] transfers   [] heat/ice application    [] other:        Other Objective/Functional Measures:    Pt with 70/55 degrees of L/R rotation at start of session  R rotation increased to 60 degrees with therapy   Post Treatment Pain Level (on 0 to 10) scale:   0  / 10     ASSESSMENT  Assessment/Changes in Function:     Increased ROM despite increased soreness after last visit     []  See Progress Note/Recertification   Patient will continue to benefit from skilled PT services to modify and progress therapeutic interventions, address functional mobility deficits, address ROM deficits, address strength deficits, analyze and address soft tissue restrictions and analyze and cue movement patterns to attain remaining goals.    Progress toward goals / Updated goals:    Improved ROM     PLAN  [x]  Upgrade activities as tolerated YES Continue plan of care   []  Discharge due to :    []  Other:      Therapist: Yris Schaeffer PT    Date: 7/23/2020 Time: 3:30 PM     Future Appointments   Date Time Provider Jeannine Garcia   7/27/2020 12:15 PM Loann Ditto [facility]   7/29/2020 12:30 PM Loann Ditto [facility]   8/3/2020 12:45 PM Stephanie Manning PT [facility]   8/5/2020  1:00 PM Loann Ditto [facility]

## 2020-07-27 ENCOUNTER — APPOINTMENT (OUTPATIENT)
Dept: PHYSICAL THERAPY | Age: 30
End: 2020-07-27
Payer: COMMERCIAL

## 2020-07-29 ENCOUNTER — APPOINTMENT (OUTPATIENT)
Dept: PHYSICAL THERAPY | Age: 30
End: 2020-07-29
Payer: COMMERCIAL

## 2020-07-30 ENCOUNTER — HOSPITAL ENCOUNTER (OUTPATIENT)
Dept: PHYSICAL THERAPY | Age: 30
Discharge: HOME | End: 2020-07-30
Payer: COMMERCIAL

## 2020-07-30 PROCEDURE — 97110 THERAPEUTIC EXERCISES: CPT

## 2020-07-30 PROCEDURE — 97140 MANUAL THERAPY 1/> REGIONS: CPT

## 2020-07-30 NOTE — PROGRESS NOTES
PHYSICAL THERAPY - DAILY TREATMENT NOTE     Patient Name: Joy Holcomb        Date: 2020  : 1990   YES Patient  Verified  Visit #:     Insurance: Payor: Shayan Fermin / Plan: [facility] PPO / Product Type: PPO /      In time: 1240 Out time: 140   Total Treatment Time: 60     Medicare/BCBS Time Tracking (below)   Total Timed Codes (min):  50 1:1 Treatment Time:       TREATMENT AREA =  Neck pain [M54.2]    SUBJECTIVE    Pain Level (on 0 to 10 scale):  2  / 10   Medication Changes/New allergies or changes in medical history, any new surgeries or procedures? NO    If yes, update Summary List   Subjective Functional Status/Changes:  []  No changes reported     Functional improvements: I can move my neck much better over the last week or so. Functional impairments: Still having pain into the neck and mostly (R) shoulder today. OBJECTIVE  Modalities Rationale:     decrease edema, decrease inflammation and decrease pain to improve patient's ability to perform pain free ADLs.        min [] Estim, type/location:                                      []  att     []  unatt     []  w/US     []  w/ice    []  w/heat    min []  Mechanical Traction: type/lbs                   []  pro   []  sup   []  int   []  cont    []  before manual    []  after manual    min []  Ultrasound, settings/location:      min []  Iontophoresis w/ dexamethasone, location:                                               []  take home patch       []  in clinic   10 min [x]  Ice     []  Heat    location/position: Supine, cervical.     min []  Vasopneumatic Device, press/temp:     min []  Other:    [x] Skin assessment post-treatment (if applicable):    [x]  intact    []  redness- no adverse reaction     []redness  adverse reaction:      35 min Therapeutic Exercise:  [x]  See flow sheet   Rationale:      increase ROM, increase strength, improve coordination and improve balance to improve the patients ability to perform pain free ADLs. 15 min Manual Therapy: Technique:      [x] S/DTM []IASTM [x]PROM [] Passive Stretching   [x]manual TPR    []Jt manipulation:Gr I [] II []  III [] IV[] V[]  Treatment Area:     Rationale:      decrease pain, increase ROM, increase tissue extensibility and decrease trigger points to improve patient's ability to perform pain free ADLs. Billed With/As:   [x] TE   [] TA   [] Neuro   [] Self Care Patient Education: [x] Review HEP    [] Progressed/Changed HEP based on:   [] positioning   [] body mechanics   [] transfers   [] heat/ice application    [] other:        Other Objective/Functional Measures: Therex per flow sheet. Post Treatment Pain Level (on 0 to 10) scale:   1 / 10     ASSESSMENT    Assessment/Changes in Function:     Good response to manual massage (R) side cs paraspinals. SOR beneficial.      []  See Progress Note/Recertification   Patient will continue to benefit from skilled PT services to modify and progress therapeutic interventions, address functional mobility deficits, address ROM deficits, address strength deficits, analyze and address soft tissue restrictions, analyze and cue movement patterns, analyze and modify body mechanics/ergonomics and assess and modify postural abnormalities to attain remaining goals. Progress toward goals / Updated goals:    Good progress toward LTG #1.       PLAN    [x]  Upgrade activities as tolerated YES Continue plan of care   []  Discharge due to :    []  Other:      Therapist: Violetta Bauman PTA    Date: 7/30/2020 Time: 4:11 PM     Future Appointments   Date Time Provider Jeannine Garcia   8/3/2020 12:45 PM Yulissa Vanegas PT [facility]   8/5/2020  1:00 PM Cruz Bolivar [facility]

## 2020-08-03 ENCOUNTER — HOSPITAL ENCOUNTER (OUTPATIENT)
Dept: PHYSICAL THERAPY | Age: 30
Discharge: HOME | End: 2020-08-03
Payer: COMMERCIAL

## 2020-08-03 PROCEDURE — 97140 MANUAL THERAPY 1/> REGIONS: CPT

## 2020-08-03 PROCEDURE — 97535 SELF CARE MNGMENT TRAINING: CPT

## 2020-08-03 PROCEDURE — 97110 THERAPEUTIC EXERCISES: CPT

## 2020-08-03 NOTE — PROGRESS NOTES
PHYSICAL THERAPY - DAILY TREATMENT NOTE     Patient Name: Hazel Pulido        Date: 8/3/2020  : 1990   YES Patient  Verified  Visit #:   15   of   18  Insurance: Payor: Lauri Vences / Plan: [facility] PPO / Product Type: PPO /      In time: 74 Out time: 150   Total Treatment Time: 65     Medicare/BCBS Time Tracking (below)   Total Timed Codes (min):  55 1:1 Treatment Time:  55     TREATMENT AREA =  Neck pain [M54.2]    SUBJECTIVE    Pain Level (on 0 to 10 scale):   10   Medication Changes/New allergies or changes in medical history, any new surgeries or procedures? NO    If yes, update Summary List   Subjective Functional Status/Changes:  []  No changes reported   See PN.      OBJECTIVE  Modalities Rationale:     decrease pain to improve patient's ability to perform ADLs, IADLs      min [] Estim, type/location:                                      []  att     []  unatt     []  w/US     []  w/ice    []  w/heat    min []  Mechanical Traction: type/lbs                   []  pro   []  sup   []  int   []  cont    []  before manual    []  after manual    min []  Ultrasound, settings/location:      min []  Iontophoresis w/ dexamethasone, location:                                               []  take home patch       []  in clinic    min []  Ice     []  Heat    location/position:     min []  Vasopneumatic Device, press/temp:     min []  Other:    [x] Skin assessment post-treatment (if applicable):    [x]  intact    []  redness- no adverse reaction     []redness  adverse reaction:      25  min Therapeutic Exercise:  [x]  See flow sheet   Rationale:      increase ROM and increase strength to improve the patients ability to perform ADLs, IADLs with less pain       10 min Self Care: HEP for foam roll/towel roll upper thoracic mobility   Rationale:    increase ROM and increase strength to improve the patients ability to perform ADLs, return to work activity         20 min Manual Therapy: Technique:      [x] S/DTM []IASTM []PROM [] Passive Stretching   []manual TPR    [x]Jt manipulation:Gr I [x] II [x]  III [x] IV[x] V[]  Treatment Area:  IASTM to R cervical and upper thoracic paraspinals, cervical segmental downglides at C4-C7; PA to upper thoracic region   Rationale:      decrease pain, increase ROM and increase tissue extensibility to improve patient's ability to perform ADLs with less pain      Billed With/As:   [x] TE   [] TA   [] Neuro   [] Self Care Patient Education: [x] Review HEP    [] Progressed/Changed HEP based on:   [x] positioning   [] body mechanics   [] transfers   [] heat/ice application    [] other:        Other Objective/Functional Measures:  Pt with dysfunctional nonpainful cervical rotation, full in supine and with PROM. His upper and mid thoracic region are Gr 2 for hypomobility testing, ed on HEP. See PN. Post Treatment Pain Level (on 0 to 10) scale:   1  / 10     ASSESSMENT    Assessment/Changes in Function:     See PN.     []  See Progress Note/Recertification   Patient will continue to benefit from skilled PT services to modify and progress therapeutic interventions, address functional mobility deficits, address ROM deficits, address strength deficits, analyze and address soft tissue restrictions and analyze and modify body mechanics/ergonomics to attain remaining goals. Progress toward goals / Updated goals:    See PN.      PLAN    [x]  Upgrade activities as tolerated YES Continue plan of care   []  Discharge due to :    [x]  Other: Would benefit from CKC stability in scapular region--motor control emphasis     Therapist: Av Ramsey PT    Date: 8/3/2020 Time: 8:56 AM     Future Appointments   Date Time Provider Jeannine Garcia   8/5/2020  1:00 PM Louanna Shear [facility]   8/12/2020  2:45 PM Brissa Piedra PT [facility]   8/13/2020  2:45 PM Louanna Shear [facility]   8/18/2020  6:00 PM Brissa Piedra PT [facility]   8/20/2020  5:45 PM Abraham Serge [facility]   8/25/2020  5:15 PM Abraham Serge [facility]   8/27/2020  5:45 PM Blaze Avilez, Emre Young, PT [facility]

## 2020-08-03 NOTE — PROGRESS NOTES
[address]  [facility] AT [address] [facility], [address] [place] [address], [address] - Phone: [phone]  Fax: [phone]  PROGRESS NOTE  Patient Name: Hazel Pulido : 1990   Treatment/Medical Diagnosis: Neck pain [M54.2]   Referral Source: MD Jin Torrez MD     Date of Initial Visit: 3/5/20 Attended Visits: 13(6 prior to hold) Missed Visits: 0     SUMMARY of TREATMENT  Therapeutic exercise, Therapeutic activities, Neuromuscular re-education, Physical agent/modality, Manual therapy and Patient education    CURRENT STATUS  Pt has been seen 7 visits since previous update 20. He is demonstrating increased cervical AROM improved, however he notes pain at [facility] R rotation at 60 deg AROM. Pt able to perform supine and PROM cervical rotation with 80 deg ROM, indicating motor control and strength deficit for control in functional activity. He demonstrates hypomobility in upper thoracic spine at Gr 2. His FOTO is currently at 61. Previous Goals:  1. All ADL's without neck pain  2. Increase FOTO score to > 65, to indicate increased function  3. Full functional C/S AROM without provocation of symptoms. 4.  Patient Independent with HEP/selfcare     Prior Level/Current Level:  1) Prior Level: pain with ADLs, ranging from 2-7/10   Current Level: pain with ADLs,  ranges from 0-3/10   Goal Met? no   2) Prior Level: 54   Current Level: 63   Goal Met? progressing  3) Prior Level: C/S AROM: flex = 55 degrees, Ext = 60 degrees, R/L lat flex = 28/32, R/L rotation = 45/45 degrees, all with post neck pain   Current Level: CS AROM: flex= 60, ext= 60, R/L rot= 60/64   Goal Met? no  4) Prior Level:  partially compliant   Current Level: compliant   Goal Met? yes    New Goals to be achieved in __4__  weeks:  SHORT-TERM FUNCTIONAL GOALS: Time Frame: 4 weeks  1.   Pt will report <=3/10 pain to cervical spine with performance of functional spinal mobility and rotation. 2.  The patient will be independent in final, advanced HEP for long-term management of symptoms. 3.  Patient will demonstrate functional and nonpainful B AROM for cervical rotation and extension in seated and standing testing to improve work function. 4. Pt will demonstrate ability to perform CKC strengthening for scapular region x 5 min duration without dysfunctional movement pattern to improve strength for ADLs, return to work activity. RECOMMENDATIONS  Progression of POC @ 1-2x/week for 4 weeks, with emphasis on improving strength and functional ROM to improve work and IADLs status. If you have any questions/comments please contact us directly at [phone]. Thank you for allowing us to assist in the care of your patient. Therapist Signature: Edwina Lopez PT Date: 8/3/2020   Reporting Period:  Certification Period:    Time: 1:28 PM   NOTE TO PHYSICIAN:  PLEASE COMPLETE THE ORDERS BELOW AND FAX TO [facility] AT [facility]. [address] Fax: [phone]    If you are unable to process t his request in 24 hours please contact our office: [phone].    ___ I have read the above report and request that my patient continue as recommended.   ___ I have read the above report and request that my patient continue therapy with the following changes/special instructions:_________________________________________   ___ I have read the above report and request that my patient be discharged from therapy.      Physician Signature:        Date:       Time:

## 2020-08-05 ENCOUNTER — HOSPITAL ENCOUNTER (OUTPATIENT)
Dept: PHYSICAL THERAPY | Age: 30
Discharge: HOME | End: 2020-08-05
Payer: COMMERCIAL

## 2020-08-05 PROCEDURE — 97110 THERAPEUTIC EXERCISES: CPT

## 2020-08-05 PROCEDURE — 97140 MANUAL THERAPY 1/> REGIONS: CPT

## 2020-08-05 NOTE — PROGRESS NOTES
PHYSICAL THERAPY - DAILY TREATMENT NOTE    Patient Name: Omar Hopper        Date: 2020  : 1990    Patient  Verified: YES  Visit #:   15   of   20  Insurance: Payor: Cristel Bailey / Plan: [facility] PPO / Product Type: PPO /      In time: 1:05 Out time: 1:55   Total Treatment Time: 50     Medicare Time Tracking (below)   Total Timed Codes (min):  - 1:1 Treatment Time:  -     TREATMENT AREA/ DIAGNOSIS = Neck pain [M54.2]    SUBJECTIVE  Pain Level (on 0 to 10 scale):  2  / 10   Medication Changes/New allergies or changes in medical history, any new surgeries or procedures?     NO    If yes, update Summary List   Subjective Functional Status/Changes:  []  No changes reported     Pain with rotation to the R on the R side of the neck      OBJECTIVE  Modalities Rationale:     decrease inflammation and decrease pain to improve patient's ability to perform ADLs without pain     min [] Estim, type/location:                                      []  att     []  unatt     []  w/US     []  w/ice    []  w/heat    min []  Mechanical Traction: type/lbs                   []  pro   []  sup   []  int   []  cont    []  before manual    []  after manual    min []  Ultrasound, settings/location:      min []  Iontophoresis w/ dexamethasone, location:                                               []  take home patch       []  in clinic   10 min [x]  Ice     []  Heat    location/position: C/S    min []  Vasopneumatic Device, press/temp:     min []  Other:    [] Skin assessment post-treatment (if applicable):    []  intact    []  redness- no adverse reaction     []redness  adverse reaction:        30 min Therapeutic Exercise:  [x]  See flow sheet   Rationale:      increase strength and improve coordination to improve the patients ability to perform ADLs without pain       10 min Manual Therapy: STM to c/s (R levator and paraspinals)   Rationale:      increase ROM and increase tissue extensibility to improve patient's ability to perform ADLs without pain      Billed With/As:   [x] TE   [] TA   [] Neuro   [] Self Care Patient Education: [x] Review HEP    [] Progressed/Changed HEP based on:   [] positioning   [] body mechanics   [] transfers   [] heat/ice application    [] other:        Other Objective/Functional Measures:    PROM WFL  TTP to R paraspinals and levator   Post Treatment Pain Level (on 0 to 10) scale:   2  / 10     ASSESSMENT  Assessment/Changes in Function:     Improved pain free mobility after treatment session     []  See Progress Note/Recertification   Patient will continue to benefit from skilled PT services to modify and progress therapeutic interventions, address functional mobility deficits, address ROM deficits, address strength deficits and analyze and address soft tissue restrictions to attain remaining goals.    Progress toward goals / Updated goals:    Good Progress to    [] STG    [x] LTG  1 as shown by improved pain free mobility after treatment session that is needed for improved ADL ability     PLAN  [x]  Upgrade activities as tolerated YES Continue plan of care   []  Discharge due to :    []  Other:      Therapist: Bonnie Rashid DPT     Date: 8/5/2020 Time: 1:21 PM        Future Appointments   Date Time Provider Jeannine Garcia   8/12/2020  2:45 PM Edmund Taylor PT [facility]   8/13/2020  2:45 PM Nolberto Axon [facility]   8/18/2020  6:00 PM Edmund Taylor PT [facility]   8/20/2020  5:45 PM Edmund Taylor PT [facility]   8/25/2020  5:15 PM Edmund Taylor PT [facility]   8/27/2020  5:45 PM Edmund Taylor PT [facility]

## 2020-08-12 ENCOUNTER — APPOINTMENT (OUTPATIENT)
Dept: PHYSICAL THERAPY | Age: 30
End: 2020-08-12
Payer: COMMERCIAL

## 2020-08-13 ENCOUNTER — APPOINTMENT (OUTPATIENT)
Dept: PHYSICAL THERAPY | Age: 30
End: 2020-08-13
Payer: COMMERCIAL

## 2020-08-18 ENCOUNTER — HOSPITAL ENCOUNTER (OUTPATIENT)
Dept: PHYSICAL THERAPY | Age: 30
Discharge: HOME | End: 2020-08-18
Payer: COMMERCIAL

## 2020-08-18 PROCEDURE — 97110 THERAPEUTIC EXERCISES: CPT

## 2020-08-18 PROCEDURE — 97140 MANUAL THERAPY 1/> REGIONS: CPT

## 2020-08-18 NOTE — PROGRESS NOTES
PHYSICAL THERAPY - DAILY TREATMENT NOTE    Patient Name: Hazel Pulido        Date: 2020  : 1990    Patient  Verified: YES  Visit #:      20  Insurance: Payor: BLUE CROSS / Plan: [facility] PPO / Product Type: PPO /      In time: 6 Out time: 6;30   Total Treatment Time: 30     Medicare/[facility] Time Tracking (below)   Total Timed Codes (min):  30 1:1 Treatment Time:  30     TREATMENT AREA/ DIAGNOSIS = Neck pain [M54.2]    SUBJECTIVE  Pain Level (on 0 to 10 scale):  3  / 10   Medication Changes/New allergies or changes in medical history, any new surgeries or procedures?     NO    If yes, update Summary List   Subjective Functional Status/Changes:  []  No changes reported     Functional improvement -   Functional limitation it hurts to look down        OBJECTIVE        15 min Manual Therapy: HVLAT to T/S andd upper T/S,, DTM to C/S    Rationale:      decrease pain, increase ROM and increase tissue extensibility to improve patient's ability to perform ADLs without pain    15 min Therapeutic Exercise:  [x]  See flow sheet   Rationale:      increase ROM and increase strength to improve the patients ability to perform ADLs without pain     Billed With/As:   [x] TE   [] TA   [] Neuro   [] Self Care Patient Education: [x] Review HEP    [] Progressed/Changed HEP based on:   [] positioning   [] body mechanics   [] transfers   [] heat/ice application    [] other:        Other Objective/Functional Measures:    Pt with limited rotation, with 50/40 degrees of L/R rotation, after manual and therex focusing on deep stabilizers and ROM, increased to 65/55 degrees  Instructed pt to do RILEY every hour and see what happens   Post Treatment Pain Level (on 0 to 10) scale:   2  / 10     ASSESSMENT  Assessment/Changes in Function:     Pt with MRI that shows disc bulge in lower C/S     []  See Progress Note/Recertification   Patient will continue to benefit from skilled PT services to modify and progress therapeutic interventions, address functional mobility deficits, address ROM deficits, address strength deficits, analyze and address soft tissue restrictions, analyze and cue movement patterns, analyze and modify body mechanics/ergonomics and assess and modify postural abnormalities to attain remaining goals.    Progress toward goals / Updated goals:    Able to significnatly increase JORGE with manipulations and stretching     PLAN  [x]  Upgrade activities as tolerated YES Continue plan of care   []  Discharge due to :    []  Other:      Therapist: Stewart Whyte PT    Date: 8/18/2020 Time: 6:42 PM     Future Appointments   Date Time Provider Jeannine Garcia   8/20/2020  5:45 PM Fate Fireman [facility]   8/25/2020  5:15 PM Fate Fireman [facility]   8/27/2020  5:45 PM Ginger [place], PT [facility]   9/1/2020  5:30 PM Ginger [place], PT [facility]   9/2/2020  5:15 PM Estefani Gordony [facility]

## 2020-08-20 ENCOUNTER — HOSPITAL ENCOUNTER (OUTPATIENT)
Dept: PHYSICAL THERAPY | Age: 30
Discharge: HOME | End: 2020-08-20
Payer: COMMERCIAL

## 2020-08-20 PROCEDURE — 97140 MANUAL THERAPY 1/> REGIONS: CPT

## 2020-08-20 PROCEDURE — 97110 THERAPEUTIC EXERCISES: CPT

## 2020-08-20 NOTE — PROGRESS NOTES
PHYSICAL THERAPY - DAILY TREATMENT NOTE    Patient Name: Armand Knife        Date: 2020  : 1990    Patient  Verified: YES  Visit #:     Insurance: Payor: BLUE CROSS / Plan: [facility] PPO / Product Type: PPO /      In time: 5:45 Out time: 6:30   Total Treatment Time: 45     Medicare/[facility] Time Tracking (below)   Total Timed Codes (min):  45 1:1 Treatment Time:  45     TREATMENT AREA/ DIAGNOSIS = Neck pain [M54.2]    SUBJECTIVE  Pain Level (on 0 to 10 scale):  2   10   Medication Changes/New allergies or changes in medical history, any new surgeries or procedures? NO    If yes, update Summary List   Subjective Functional Status/Changes:  []  No changes reported     Functional improvement It has felt better since the last visit   Functional limitation pain with rotation        OBJECTIVE      25 min Manual Therapy: Passive rep extension, HVLAT to T/S, DTM to C/S, GD IV PA mobs to C/S, B trap and levator stretch    Rationale:      decrease pain, increase ROM and increase tissue extensibility to improve patient's ability to perform ADLs without pain    15 min Therapeutic Exercise:  [x]  See flow sheet   Rationale:      increase ROM and increase strength to improve the patients ability to perform ADLs without pain     Billed With/As:   [x] TE   [] TA   [] Neuro   [] Self Care Patient Education: [x] Review HEP    [] Progressed/Changed HEP based on:   [] positioning   [] body mechanics   [] transfers   [] heat/ice application    [] other:        Other Objective/Functional Measures:     Focus on mobs to increase T/S mobility, stretching of trap and levators, and activating deep ant and post C.S stabilizers  Rep ext and prolonged ext (5 min, KATIANA) did nothing to change pain   Post Treatment Pain Level (on 0 to 10) scale:   0  / 10     ASSESSMENT  Assessment/Changes in Function:     Pt responds well to aggressive manual and activation of deep stabilizers     []  See Progress Note/Recertification   Patient will continue to benefit from skilled PT services to modify and progress therapeutic interventions, address functional mobility deficits, address ROM deficits, address strength deficits, analyze and address soft tissue restrictions, analyze and cue movement patterns, analyze and modify body mechanics/ergonomics and assess and modify postural abnormalities to attain remaining goals.    Progress toward goals / Updated goals:    Less pain this past week     PLAN  [x]  Upgrade activities as tolerated YES Continue plan of care   []  Discharge due to :    []  Other:      Therapist: Iris Pina PT    Date: 8/20/2020 Time: 6:31 PM     Future Appointments   Date Time Provider Jeannine Garcia   8/25/2020  5:15 PM Marina Rawls [facility]   8/27/2020  5:45 PM Lammary Law, PT [facility]   9/1/2020  5:30 PM Redd Law, PT [facility]   9/2/2020  5:15 PM Sanna Crystal [facility]

## 2020-08-25 ENCOUNTER — HOSPITAL ENCOUNTER (OUTPATIENT)
Dept: PHYSICAL THERAPY | Age: 30
Discharge: HOME | End: 2020-08-25
Payer: COMMERCIAL

## 2020-08-25 PROCEDURE — 97140 MANUAL THERAPY 1/> REGIONS: CPT

## 2020-08-25 PROCEDURE — 97110 THERAPEUTIC EXERCISES: CPT

## 2020-08-25 NOTE — PROGRESS NOTES
PHYSICAL THERAPY - DAILY TREATMENT NOTE    Patient Name: Shabbir Arshad        Date: 2020  : 1990    Patient  Verified: YES  Visit #:     Insurance: Payor: BLUE CROSS / Plan: [facility] PPO / Product Type: PPO /      In time: 5:15 Out time: 5:55   Total Treatment Time: 40     Medicare/[facility] Time Tracking (below)   Total Timed Codes (min):  40 1:1 Treatment Time: 40     TREATMENT AREA/ DIAGNOSIS = Neck pain [M54.2]    SUBJECTIVE  Pain Level (on 0 to 10 scale):  0  / 10   Medication Changes/New allergies or changes in medical history, any new surgeries or procedures?     NO    If yes, update Summary List   Subjective Functional Status/Changes:  []  No changes reported     Functional improvement no pain right now    Functional limitation  Pain when I turn my head to the R        OBJECTIVE      20 min Manual Therapy: HVLAT/GD IV PA mobs to T/S, rotational HVLAT to upper T/s, R rotational mobs to upper T/S, R upper T/S downglides, passive rotation and B trap and levator stretch    Rationale:      decrease pain, increase ROM and increase tissue extensibility to improve patient's ability to perform ADLs without pain    20 min Therapeutic Exercise:  [x]  See flow sheet   Rationale:      increase ROM and increase strength to improve the patients ability to perform ADLs without pain     Billed With/As:   [x] TE   [] TA   [] Neuro   [] Self Care Patient Education: [x] Review HEP    [] Progressed/Changed HEP based on:   [] positioning   [] body mechanics   [] transfers   [] heat/ice application    [] other:        Other Objective/Functional Measures:    Pain with R C/S rotation in end range rotation to R in upper R C/S  Focus on increasing T/S mobility and C/S stabillty      Post Treatment Pain Level (on 0 to 10) scale:   0  / 10     ASSESSMENT  Assessment/Changes in Function:     Pt with improving ROM and less pain     []  See Progress Note/Recertification   Patient will continue to benefit from skilled PT services to modify and progress therapeutic interventions, address functional mobility deficits, address ROM deficits, address strength deficits, analyze and address soft tissue restrictions, analyze and cue movement patterns and analyze and modify body mechanics/ergonomics to attain remaining goals.    Progress toward goals / Updated goals:    Less pain and increased ROM     PLAN  [x]  Upgrade activities as tolerated YES Continue plan of care   []  Discharge due to :    []  Other:      Therapist: Jocelyn Grayson PT    Date: 8/25/2020 Time: 5:54 PM     Future Appointments   Date Time Provider Jeannine Garcia   8/27/2020  5:45 PM Crescencio Bedford [facility]   9/1/2020  5:30 PM Jus Mcknight PT [facility]   9/2/2020  5:15 PM Ketty Seth [facility]

## 2020-08-27 ENCOUNTER — HOSPITAL ENCOUNTER (OUTPATIENT)
Dept: PHYSICAL THERAPY | Age: 30
Discharge: HOME | End: 2020-08-27
Payer: COMMERCIAL

## 2020-08-27 PROCEDURE — 97140 MANUAL THERAPY 1/> REGIONS: CPT

## 2020-08-27 PROCEDURE — 97110 THERAPEUTIC EXERCISES: CPT

## 2020-08-27 NOTE — PROGRESS NOTES
PHYSICAL THERAPY - DAILY TREATMENT NOTE    Patient Name: Anival Castro        Date: 2020  : 1990    Patient  Verified: YES  Visit #:   25   of   20  Insurance: Payor: BLUE CROSS / Plan: [facility] PPO / Product Type: PPO /      In time: 5:45 Out time: 6:25   Total Treatment Time: 40     Medicare/[facility] Time Tracking (below)   Total Timed Codes (min):  40 1:1 Treatment Time:  40     TREATMENT AREA/ DIAGNOSIS = Neck pain [M54.2]    SUBJECTIVE  Pain Level (on 0 to 10 scale):  2  / 10   Medication Changes/New allergies or changes in medical history, any new surgeries or procedures? NO    If yes, update Summary List   Subjective Functional Status/Changes:  []  No changes reported     Functional improvement -   Functional limitation it hurts to turn my head to the left        OBJECTIVE      25 min Manual Therapy: HVLAT to T/S, and rotaitonal HVLAT to upper T/S. R Rotational mobs to upper T/S, DT<M and TrP release to C/S, with TrP release to R upper C/S and levator.   B trap nad levator stretch, GD IV {PA mobs to upper T/S and lower C/S    Rationale:      decrease pain, increase ROM and increase tissue extensibility to improve patient's ability to perform ADLs without pain    15 min Therapeutic Exercise:  [x]  See flow sheet   Rationale:      increase ROM and increase strength to improve the patients ability to perform ADLs without pain     Billed With/As:   [x] TE   [] TA   [] Neuro   [] Self Care Patient Education: [x] Review HEP    [] Progressed/Changed HEP based on:   [] positioning   [] body mechanics   [] transfers   [] heat/ice application    [] other:        Other Objective/Functional Measures:    Pt with R neck pain with R rotation  Pain decreased with manual    Post Treatment Pain Level (on 0 to 10) scale:   1  / 10     ASSESSMENT  Assessment/Changes in Function:     Pt reports temporary relief with PT this week     []  See Progress Note/Recertification   Patient will continue to benefit from skilled PT services to modify and progress therapeutic interventions, address functional mobility deficits, address ROM deficits, address strength deficits, analyze and address soft tissue restrictions, analyze and cue movement patterns, analyze and modify body mechanics/ergonomics and assess and modify postural abnormalities to attain remaining goals.    Progress toward goals / Updated goals:    Less pain and improved     PLAN  [x]  Upgrade activities as tolerated YES Continue plan of care   []  Discharge due to :    []  Other:      Therapist: Odalys Currie PT    Date: 8/27/2020 Time: 6:24 PM     Future Appointments   Date Time Provider Jeannine Garcia   9/1/2020  5:30 PM Marybelle London [facility]   9/2/2020  5:15 PM Selene Kern [facility]

## 2020-09-01 ENCOUNTER — HOSPITAL ENCOUNTER (OUTPATIENT)
Dept: PHYSICAL THERAPY | Age: 30
Discharge: HOME | End: 2020-09-01
Payer: COMMERCIAL

## 2020-09-01 PROCEDURE — 97110 THERAPEUTIC EXERCISES: CPT

## 2020-09-01 PROCEDURE — 97140 MANUAL THERAPY 1/> REGIONS: CPT

## 2020-09-01 NOTE — PROGRESS NOTES
PHYSICAL THERAPY - DAILY TREATMENT NOTE    Patient Name: Laurence Dupree        Date: 2020  : 1990    Patient  Verified: YES  Visit #:     Insurance: Payor: Sherlyn Clarke / Plan: [facility] PPO / Product Type: PPO /      In time: 5:30 Out time: 6   Total Treatment Time: 30     Medicare/[facility] Time Tracking (below)   Total Timed Codes (min):  30 1:1 Treatment Time:  30     TREATMENT AREA/ DIAGNOSIS = Neck pain [M54.2]    SUBJECTIVE  Pain Level (on 0 to 10 scale):  2  / 10   Medication Changes/New allergies or changes in medical history, any new surgeries or procedures?     NO    If yes, update Summary List   Subjective Functional Status/Changes:  []  No changes reported     Functional improvement about hte same   Functional limitation it hurts to turn to the right        OBJECTIVE      15 min Manual Therapy: HVLAT to t/S and upper T/S, DTM to C/S, R C/S downglides, passive R rotation, B trap and levator stretch, passive rep extension    Rationale:      decrease pain, increase ROM and increase tissue extensibility to improve patient's ability to perform ADLs without pain    15 min Therapeutic Exercise:  [x]  See flow sheet   Rationale:      increase ROM and increase strength to improve the patients ability to perform ADLs without pain     Billed With/As:   [x] TE   [] TA   [] Neuro   [] Self Care Patient Education: [x] Review HEP    [] Progressed/Changed HEP based on:   [] positioning   [] body mechanics   [] transfers   [] heat/ice application    [] other:        Other Objective/Functional Measures:    Pt with R neck pain with R rotation, decreased with manual R downglides to C/S  Requested dry needling   Post Treatment Pain Level (on 0 to 10) scale:   1  / 10     ASSESSMENT  Assessment/Changes in Function:     Less pain and improved R rotation after therapyt     []  See Progress Note/Recertification   Patient will continue to benefit from skilled PT services to modify and progress therapeutic interventions, address functional mobility deficits, address ROM deficits, address strength deficits, analyze and address soft tissue restrictions, analyze and cue movement patterns and analyze and modify body mechanics/ergonomics to attain remaining goals. Progress toward goals / Updated goals:     Much improved rotation ROM     PLAN  [x]  Upgrade activities as tolerated YES Continue plan of care   []  Discharge due to :    []  Other:      Therapist: Prince Jacobo PT    Date: 9/1/2020 Time: 6:41 PM     Future Appointments   Date Time Provider Jeannine Garcia   9/2/2020  5:15 PM Toni Oyster [facility]

## 2020-09-02 ENCOUNTER — HOSPITAL ENCOUNTER (OUTPATIENT)
Dept: PHYSICAL THERAPY | Age: 30
Discharge: HOME | End: 2020-09-02
Payer: COMMERCIAL

## 2020-09-02 PROCEDURE — 97110 THERAPEUTIC EXERCISES: CPT

## 2020-09-02 PROCEDURE — 97140 MANUAL THERAPY 1/> REGIONS: CPT

## 2020-09-02 NOTE — PROGRESS NOTES
[facility] PHYSICAL THERAPY AT [address] [place] [address], [address] [place] [address], [address], [address] [place] [address]  Phone: [phone]  Fax: [phone]  PROGRESS NOTE  Patient Name: Daniel Ohara : 1990   Treatment/Medical Diagnosis: Neck pain [M54.2]   Referral Source: Golden Mccarty MD     Date of Initial Visit: 3/5/2020 Attended Visits: 20 Missed Visits: 4     SUMMARY OF TREATMENT  PT consisted of manual therapy techniques, therapeutic exercises, and modalities to improve strength, improve mobility, decrease pain, and improve overall function. CURRENT STATUS  Pt showing very slow progress with PT. Pt still having pain on the right side of the spine with looking to the right. Pt would benefit from trying dry needling techniques due to continued plateau in progress with other treatment strategies    Goal/Measure of Progress Goal Met? 1. Pt will report <=2/10 pain to cervical spine with performance of functional spinal mobility and rotation   Status at last Eval: 2-3 Current Status: 3- no   2. The patient will be independent in final, advanced HEP for long-term management of symptoms   Status at last Eval: Still progressing Current Status: independent yes   3. Patient will demonstrate functional and nonpainful B AROM for cervical rotation and extension in seated and standing testing to improve work function   Status at last Eval: painfull Current Status: painfull no     New Goals to be achieved in __3__  treatments:  1. Continue goals above   2.  -   3.  -   4.  -       RECOMMENDATIONS  Pt to try dry needling 2-3 visits. If no improvement, pt to discharge from PT and return to physician for further assessment due to no progress toward goals. If you have any questions/comments please contact us directly at [phone]. Thank you for allowing us to assist in the care of your patient.     Therapist Signature: Yelitza Miguel Date: 2020     Time: 6:22 PM   NOTE TO PHYSICIAN:  PLEASE COMPLETE THE ORDERS BELOW AND FAX TO   [facility] at [place]: [phone]. If you are unable to process this request in 24 hours please contact our office: [phone].    ___ I have read the above report and request that my patient continue as recommended.   ___ I have read the above report and request that my patient continue therapy with the following changes/special instructions:_________________________________________________________   ___ I have read the above report and request that my patient be discharged from therapy.      Physician Signature:        Date:       Time:

## 2020-09-02 NOTE — PROGRESS NOTES
PHYSICAL THERAPY - DAILY TREATMENT NOTE    Patient Name: Anival Castro        Date: 2020  : 1990    Patient  Verified: YES  Visit #:     Insurance: Payor: BLUE CROSS / Plan: [facility] PPO / Product Type: PPO /      In time: 5:05 Out time: 5:50   Total Treatment Time: 45     Medicare Time Tracking (below)   Total Timed Codes (min):  - 1:1 Treatment Time:  -     TREATMENT AREA/ DIAGNOSIS = Neck pain [M54.2]    SUBJECTIVE  Pain Level (on 0 to 10 scale):  2  / 10   Medication Changes/New allergies or changes in medical history, any new surgeries or procedures? NO    If yes, update Summary List   Subjective Functional Status/Changes:  []  No changes reported     SEE PN      OBJECTIVE  Modalities Rationale:     decrease pain and increase tissue extensibility to improve patient's ability to perform ADLs without pain     min [] Estim, type/location:                                      []  att     []  unatt     []  w/US     []  w/ice    []  w/heat    min []  Mechanical Traction: type/lbs                   []  pro   []  sup   []  int   []  cont    []  before manual    []  after manual    min []  Ultrasound, settings/location:      min []  Iontophoresis w/ dexamethasone, location:                                               []  take home patch       []  in clinic   10 min []  Ice     [x]  Heat    location/position: c/s    min []  Vasopneumatic Device, press/temp:     min []  Other:    [] Skin assessment post-treatment (if applicable):    []  intact    []  redness- no adverse reaction     []redness  adverse reaction:        20 min Therapeutic Exercise:  [x]  See flow sheet   Rationale:      increase strength and improve coordination to improve the patients ability to perform ADLs without pain       15 min Manual Therapy: STM to c/s. C/T grade 5 mob.  T/s grade 5 mob with foam roller   Rationale:      decrease pain, increase ROM and increase tissue extensibility to improve patient's ability to perform ADLs without pain      Billed With/As:   [x] TE   [] TA   [] Neuro   [] Self Care Patient Education: [x] Review HEP    [] Progressed/Changed HEP based on:   [] positioning   [] body mechanics   [] transfers   [] heat/ice application    [] other:        Other Objective/Functional Measures:    See PN   Post Treatment Pain Level (on 0 to 10) scale:   2  / 10     ASSESSMENT  Assessment/Changes in Function:     See PN     []  See Progress Note/Recertification   Patient will continue to benefit from skilled PT services to modify and progress therapeutic interventions, address functional mobility deficits, address ROM deficits, address strength deficits, analyze and address soft tissue restrictions and analyze and cue movement patterns to attain remaining goals.    Progress toward goals / Updated goals:    See PN     PLAN  [x]  Upgrade activities as tolerated YES Continue plan of care   []  Discharge due to :    []  Other:      Therapist: Antonio Coronado DPT     Date: 9/2/2020 Time: 12:42 PM        Future Appointments   Date Time Provider Jeannine Garcia   9/2/2020  5:15 PM Cran[place] Loose [facility]

## 2020-09-16 ENCOUNTER — HOSPITAL ENCOUNTER (OUTPATIENT)
Dept: PHYSICAL THERAPY | Age: 30
Discharge: HOME | End: 2020-09-16
Payer: COMMERCIAL

## 2020-09-16 PROCEDURE — 97140 MANUAL THERAPY 1/> REGIONS: CPT

## 2020-09-16 PROCEDURE — 97110 THERAPEUTIC EXERCISES: CPT

## 2020-09-16 NOTE — PROGRESS NOTES
PHYSICAL THERAPY - DAILY TREATMENT NOTE    Patient Name: Erika Nava        Date: 2020  : 1990    Patient  Verified: YES  Visit #:     Insurance: Payor: BLUE CROSS / Plan: [facility] PPO / Product Type: PPO /      In time: 5:20 Out time: 6:00   Total Treatment Time: 40     Medicare Time Tracking (below)   Total Timed Codes (min):  - 1:1 Treatment Time:  -     TREATMENT AREA/ DIAGNOSIS = Neck pain [M54.2]    SUBJECTIVE  Pain Level (on 0 to 10 scale):  2  / 10   Medication Changes/New allergies or changes in medical history, any new surgeries or procedures? NO    If yes, update Summary List   Subjective Functional Status/Changes:  []  No changes reported     Pt reports still having R sided neck pain with rotation.        OBJECTIVE  Modalities Rationale:     decrease inflammation and decrease pain to improve patient's ability to perform ADLs without pain     min [] Estim, type/location:                                      []  att     []  unatt     []  w/US     []  w/ice    []  w/heat    min []  Mechanical Traction: type/lbs                   []  pro   []  sup   []  int   []  cont    []  before manual    []  after manual    min []  Ultrasound, settings/location:      min []  Iontophoresis w/ dexamethasone, location:                                               []  take home patch       []  in clinic   10 min []  Ice     [x]  Heat    location/position:     min []  Vasopneumatic Device, press/temp:     min []  Other:    [] Skin assessment post-treatment (if applicable):    []  intact    []  redness- no adverse reaction     []redness  adverse reaction:        30 min Therapeutic Exercise:  [x]  See flow sheet   Rationale:      increase ROM and increase strength to improve the patients ability to perform ADLs without pain       10 min Manual Therapy: STM to c/s, grade 5 t/s mob with foam roller and grade 5 c/t junction mob   Rationale:      increase ROM and increase tissue extensibility to improve patient's ability to perform ADLs without pain    Billed With/As:   [x] TE   [] TA   [] Neuro   [] Self Care Patient Education: [x] Review HEP    [] Progressed/Changed HEP based on:   [] positioning   [] body mechanics   [] transfers   [] heat/ice application    [] other:        Other Objective/Functional Measures:    Increased tonicity to the right side of c/s   Post Treatment Pain Level (on 0 to 10) scale:   2  / 10     ASSESSMENT  Assessment/Changes in Function:     Little improvement functionally with current program. Pt to try dry needling next visits     []  See Progress Note/Recertification   Patient will continue to benefit from skilled PT services to modify and progress therapeutic interventions, address functional mobility deficits, address ROM deficits, address strength deficits and analyze and address soft tissue restrictions to attain remaining goals. Progress toward goals / Updated goals:    Slow Progress to    [] STG    [x] LTG  1 as shown by no change in pain levels with ADLs     PLAN  [x]  Upgrade activities as tolerated YES Continue plan of care   []  Discharge due to :    []  Other:      Therapist: Burnetta Kanner ,DPT     Date: 9/16/2020 Time: 5:47 PM        No future appointments.

## 2020-10-05 ENCOUNTER — HOSPITAL ENCOUNTER (OUTPATIENT)
Dept: PHYSICAL THERAPY | Age: 30
Discharge: HOME | End: 2020-10-05
Payer: COMMERCIAL

## 2020-10-05 PROCEDURE — 97032 APPL MODALITY 1+ESTIM EA 15: CPT

## 2020-10-05 PROCEDURE — 97140 MANUAL THERAPY 1/> REGIONS: CPT

## 2020-10-05 PROCEDURE — 20561 NDL INSJ W/O NJX 3+ MUSC: CPT

## 2020-10-05 NOTE — PROGRESS NOTES
PHYSICAL THERAPY - DAILY TREATMENT NOTE - DRY NEEDLE NOTE    Patient Name: Cb Ryder        Date: 10/5/2020  : 1990   YES  Patient  Verified  Visit #:     Insurance: Payor: BLUE CROSS / Plan: [facility] PPO / Product Type: PPO /      In time: 505 Out time: 550   Total Treatment Time: 45     Medicare Time Tracking (below)   Total Timed Codes (min):  30 1:1 Treatment Time:  35     TREATMENT AREA = Neck pain [M54.2]    SUBJECTIVE  Pain Level (on 0 to 10 scale):  2  / 10   Medication Changes/New allergies or changes in medical history, any new surgeries or procedures? NO    If yes, update Summary List   Subjective Functional Status/Changes:  []  No changes reported     Some knots in R neck area.   No n/t          OBJECTIVE  Modalities Rationale:     decrease pain, increase tissue extensibility, decrease trigger point density and increase stability/ ROM within muscle to improve patient's ability to perform ADLs/ return to PLOF  10 min [x] Estim, type/location: Direct using Pointer Excel II ( between 1-16 HZ) to DN                                     [x]  att     []  unatt     [x]  W/dry needling     []  w/ice    []  w/heat    min []  Mechanical Traction: type/lbs                   []  pro   []  sup   []  int   []  cont    []  before manual    []  after manual    min []  Ultrasound, settings/location:      min []  Iontophoresis w/ dexamethasone, location:                                               []  take home patch       []  in clinic   10 min [x]  Ice     []  Heat    location/position:     min []  Vasopneumatic Device, press/temp:     min []  Other:    [x] Skin assessment post-treatment (if applicable):    [x]  intact    []  redness- no adverse reaction     []redness  adverse reaction:        10 min Manual Therapy: Palpation, assessment of TP  -(needle insertion time not included)    Rationale:     decrease pain, increase tissue extensibility, decrease trigger point density and increase stability within muscle to improve patient's ability to perform ADLs/ return to [facility]    Select one untimed code below based on number of muscle groups needled:  []  CPT 59081:  needle insertion(s) without injection(s); 1 or 2 muscle(s)  [x]  CPT 44973:  needle insertion(s) without injection(s); 3 or more muscles. 5 Min--dry needle insertion   Rationale:      decrease pain, increase tissue extensibility, and decrease trigger points to improve patiets ability to perform ADLs    10 min Therapeutic Exercise:  [x]  See flow sheet   Modalities Rationale:     decrease pain, increase tissue extensibility, decrease trigger point density and increase stability within muscle to improve patient's ability to perform ADLs/ return to OF      Billed With/As:   [] TE   [x] MT   [] Neuro   [x] modalities Patient Education: [x] Review HEP    [] Progressed/Changed HEP based on:   [] positioning   [] body mechanics   [x] indication/ precautions/ expectations of DN    [] heat/ice application    [] other:          OTHER OBJECTIVE/FUNCTIONAL MEASURES:    Dry Needling Procedure Note    Dry Needle Session Number:  1    Procedure: An intramuscular manual therapy (dry needling) and a neuro-muscular re-education treatment was done to deactivate myofascial trigger points, with a 15/30 gauge solid filament needle, under aseptic technique. Indication(s): [x] Muscle spasms [x] Myalgia/Myositis  [] Muscle cramps      [] Muscle imbalances [] TMD (TMJ) [x] Myofascial pain & dysfunction     [] Other: __    Chart reviewed for the following:  I, Matt Hamman, PT, have reviewed the medical history, summary list and precautions/contraindications for Nargis Junk.     TIME OUT performed immediately prior to start of procedure:  510 (enter time the timeout was completed)  I, Matt Hamman, PT, have performed the following reviews on Nargis Junk prior to the start of the session:      [x] Patient was identified by name and date of birth    [x] Agreement on all muscles being treated was verified   [x] Purpose of dry needling, side effects, possible complications, risks and benefits were explained to the patient   [x] Procedure site(s) verified  [x] Patient was positioned for comfort and draped for privacy  [x] Informed Consent was signed (initial visit) and verified verbally (subsequent visits)  [x] Patient was instructed on the need to report the use of blood thinners and/or immunosuppressant medications  [x] How to respond to possible adverse effects of treatment  [x] Self treatment of post needling soreness: ice, heat (moist heat, heat wraps) and stretching  [x] Opportunity was given to ask any questions, all questions were answered            Treatment:  The following muscles were treated today (needle insertion with with direct ESTIM):    Right: UT, MF- C3&C6   Left: UT     Patients response to todays treatment:   [x]  LTRs  [x]  Muscle Relaxation  []  Pain Relief  []  Decreased Tinnitus  []  Decreased HAs [x]  Post needling soreness []  Increased ROM   []  Other:           Post Treatment Pain Level (on 0 to 10) scale:   2  / 10     ASSESSMENT  Assessment/Changes in Function:     Improved mobility turning neck following treatment     []  See Progress Note/Recertification   Patient will continue to benefit from skilled PT services to modify and progress therapeutic interventions, address functional mobility deficits, address ROM deficits, address strength deficits, analyze and address soft tissue restrictions and analyze and cue movement patterns to attain remaining goals.    Progress toward goals / Updated goals:    Good tolerance for DNing     PLAN  [x]  Upgrade activities as tolerated YES  Continue plan of care   []  Discharge due to :    []  Other:      Therapist: Adri Goldman PT    Date: 10/5/2020 Time: 5:07 PM     Future Appointments   Date Time Provider Jeannine Garcia   10/12/2020  5:00 PM [employer], Darrian Chapman

## 2020-10-12 ENCOUNTER — HOSPITAL ENCOUNTER (OUTPATIENT)
Dept: PHYSICAL THERAPY | Age: 30
Discharge: HOME | End: 2020-10-12
Payer: COMMERCIAL

## 2020-10-12 PROCEDURE — 97110 THERAPEUTIC EXERCISES: CPT

## 2020-10-12 PROCEDURE — 20561 NDL INSJ W/O NJX 3+ MUSC: CPT

## 2020-10-12 PROCEDURE — 97140 MANUAL THERAPY 1/> REGIONS: CPT

## 2020-10-12 PROCEDURE — 97032 APPL MODALITY 1+ESTIM EA 15: CPT

## 2020-10-12 NOTE — PROGRESS NOTES
PHYSICAL THERAPY - DAILY TREATMENT NOTE - DRY NEEDLE NOTE    Patient Name: Cb Ryder        Date: 10/12/2020  : 1990   YES  Patient  Verified  Visit #:     Insurance: Payor: BLUE CROSS / Plan: [facility] PPO / Product Type: PPO /      In time: 500 Out time: 600   Total Treatment Time: 55     Medicare Time Tracking (below)   Total Timed Codes (min):  40 1:1 Treatment Time:  45     TREATMENT AREA = Neck pain [M54.2]    SUBJECTIVE  Pain Level (on 0 to 10 scale):    / 10   Medication Changes/New allergies or changes in medical history, any new surgeries or procedures?     NO    If yes, update Summary List   Subjective Functional Status/Changes:  []  No changes reported     Shoulder feels better after dry needling, neck still painful          OBJECTIVE  Modalities Rationale:     decrease pain, increase tissue extensibility, decrease trigger point density and increase stability/ ROM within muscle to improve patient's ability to perform ADLs/ return to PLOF  10 min [x] Estim, type/location: Direct using Pointer Excel II ( between 1-16 HZ) to DN                                     [x]  att     []  unatt     [x]  W/dry needling     []  w/ice    []  w/heat    min []  Mechanical Traction: type/lbs                   []  pro   []  sup   []  int   []  cont    []  before manual    []  after manual    min []  Ultrasound, settings/location:      min []  Iontophoresis w/ dexamethasone, location:                                               []  take home patch       []  in clinic   10 min [x]  Ice     []  Heat    location/position:     min []  Vasopneumatic Device, press/temp:     min []  Other:    [x] Skin assessment post-treatment (if applicable):    [x]  intact    []  redness- no adverse reaction     []redness  adverse reaction:        15 min Manual Therapy: Palpation, assessment of TP  -(needle insertion time not included)    Rationale:     decrease pain, increase tissue extensibility, decrease trigger point density and increase stability within muscle to improve patient's ability to perform ADLs/ return to PLOF    Select one untimed code below based on number of muscle groups needled:  []  CPT 03977:  needle insertion(s) without injection(s); 1 or 2 muscle(s)  [x]  CPT 94043:  needle insertion(s) without injection(s); 3 or more muscles. 5 Min--dry needle insertion   Rationale:      decrease pain, increase tissue extensibility, and decrease trigger points to improve patiets ability to perform ADLs    15* min Therapeutic Exercise:  [x]  See flow sheet   Modalities Rationale:     decrease pain, increase tissue extensibility, decrease trigger point density and increase stability within muscle to improve patient's ability to perform ADLs/ return to PLOF      Billed With/As:   [] TE   [x] MT   [] Neuro   [x] modalities Patient Education: [x] Review HEP    [] Progressed/Changed HEP based on:   [] positioning   [] body mechanics   [x] indication/ precautions/ expectations of DN    [] heat/ice application    [] other:          OTHER OBJECTIVE/FUNCTIONAL MEASURES:    Dry Needling Procedure Note    Dry Needle Session Number:  2    Procedure: An intramuscular manual therapy (dry needling) and a neuro-muscular re-education treatment was done to deactivate myofascial trigger points, with a 15/30 gauge solid filament needle, under aseptic technique. Indication(s): [x] Muscle spasms [x] Myalgia/Myositis  [] Muscle cramps      [] Muscle imbalances [] TMD (TMJ) [x] Myofascial pain & dysfunction     [] Other: __    Chart reviewed for the following:  IToi PT, have reviewed the medical history, summary list and precautions/contraindications for Cristian Hilt.     TIME OUT performed immediately prior to start of procedure:  505 (enter time the timeout was completed)  Toi AVELAR PT, have performed the following reviews on Cristian Hilt prior to the start of the session:      [x] Patient was identified by name and date of birth    [x] Agreement on all muscles being treated was verified   [x] Purpose of dry needling, side effects, possible complications, risks and benefits were explained to the patient   [x] Procedure site(s) verified  [x] Patient was positioned for comfort and draped for privacy  [x] Informed Consent was signed (initial visit) and verified verbally (subsequent visits)  [x] Patient was instructed on the need to report the use of blood thinners and/or immunosuppressant medications  [x] How to respond to possible adverse effects of treatment  [x] Self treatment of post needling soreness: ice, heat (moist heat, heat wraps) and stretching  [x] Opportunity was given to ask any questions, all questions were answered            Treatment:  The following muscles were treated today (needle insertion with with direct ESTIM):    Right: UT, LS, MF- C3, C6, T1   Left: UT     Patients response to todays treatment:   [x]  LTRs  [x]  Muscle Relaxation  []  Pain Relief  []  Decreased Tinnitus  []  Decreased HAs [x]  Post needling soreness []  Increased ROM   []  Other:           Post Treatment Pain Level (on 0 to 10) scale:   2  / 10     ASSESSMENT  Assessment/Changes in Function:     Less \"pulling\" with Cx rotation, improved Sx in shoulder     []  See Progress Note/Recertification   Patient will continue to benefit from skilled PT services to modify and progress therapeutic interventions, address functional mobility deficits, address ROM deficits, address strength deficits, analyze and address soft tissue restrictions and analyze and cue movement patterns to attain remaining goals. Progress toward goals / Updated goals:    Benefits from DN--good result     PLAN  [x]  Upgrade activities as tolerated YES  Continue plan of care   []  Discharge due to :    []  Other:      Therapist: Brian Salazar PT    Date: 10/12/2020 Time: 5:02 PM     No future appointments.

## 2020-10-28 ENCOUNTER — APPOINTMENT (OUTPATIENT)
Dept: PHYSICAL THERAPY | Age: 30
End: 2020-10-28
Payer: COMMERCIAL

## 2020-11-02 ENCOUNTER — HOSPITAL ENCOUNTER (OUTPATIENT)
Dept: PHYSICAL THERAPY | Age: 30
Discharge: HOME | End: 2020-11-02
Payer: COMMERCIAL

## 2020-11-02 PROCEDURE — 97110 THERAPEUTIC EXERCISES: CPT

## 2020-11-02 PROCEDURE — 97032 APPL MODALITY 1+ESTIM EA 15: CPT

## 2020-11-02 PROCEDURE — 97140 MANUAL THERAPY 1/> REGIONS: CPT

## 2020-11-02 PROCEDURE — 20561 NDL INSJ W/O NJX 3+ MUSC: CPT

## 2020-11-02 NOTE — PROGRESS NOTES
PHYSICAL THERAPY - DAILY TREATMENT NOTE - DRY NEEDLE NOTE    Patient Name: Nargis García        Date: 2020  : 1990   YES  Patient  Verified  Visit #:   24   of     Insurance: Payor: BLUE CROSS / Plan: [facility] PPO / Product Type: PPO /      In time: 500 Out time: 555   Total Treatment Time: 53     Medicare Time Tracking (below)   Total Timed Codes (min):  38 1:1 Treatment Time:  43     TREATMENT AREA = Neck pain [M54.2]    SUBJECTIVE  Pain Level (on 0 to 10 scale):    / 10   Medication Changes/New allergies or changes in medical history, any new surgeries or procedures? NO    If yes, update Summary List   Subjective Functional Status/Changes:  []  No changes reported     Pain is not going away.   Talked to MD.  Considering another ADRIANNE          OBJECTIVE  Modalities Rationale:     decrease pain, increase tissue extensibility, decrease trigger point density and increase stability/ ROM within muscle to improve patient's ability to perform ADLs/ return to PLOF  10 min [x] Estim, type/location: Direct using Pointer Excel II ( between 1-16 HZ) to DN                                     [x]  att     []  unatt     [x]  W/dry needling     []  w/ice    []  w/heat    min []  Mechanical Traction: type/lbs                   []  pro   []  sup   []  int   []  cont    []  before manual    []  after manual    min []  Ultrasound, settings/location:      min []  Iontophoresis w/ dexamethasone, location:                                               []  take home patch       []  in clinic   10 min []  Ice     []  Heat    location/position:     min []  Vasopneumatic Device, press/temp:     min []  Other:    [x] Skin assessment post-treatment (if applicable):    [x]  intact    []  redness- no adverse reaction     []redness  adverse reaction:        13 min Manual Therapy: Palpation, assessment of TP  -(needle insertion time not included)    Rationale:     decrease pain, increase tissue extensibility, decrease trigger point density and increase stability within muscle to improve patient's ability to perform ADLs/ return to PLOF    Select one untimed code below based on number of muscle groups needled:  []  CPT 60570:  needle insertion(s) without injection(s); 1 or 2 muscle(s)  [x]  CPT 42879:  needle insertion(s) without injection(s); 3 or more muscles. 5 Min--dry needle insertion   Rationale:      decrease pain, increase tissue extensibility, and decrease trigger points to improve patiets ability to perform ADLs    15 min Therapeutic Exercise:  [x]  See flow sheet   Modalities Rationale:     decrease pain, increase tissue extensibility, decrease trigger point density and increase stability within muscle to improve patient's ability to perform ADLs/ return to PLOF      Billed With/As:   [] TE   [x] MT   [] Neuro   [x] modalities Patient Education: [x] Review HEP    [] Progressed/Changed HEP based on:   [] positioning   [] body mechanics   [x] indication/ precautions/ expectations of DN    [] heat/ice application    [] other:          OTHER OBJECTIVE/FUNCTIONAL MEASURES:    Dry Needling Procedure Note    Dry Needle Session Number:  3    Procedure: An intramuscular manual therapy (dry needling) and a neuro-muscular re-education treatment was done to deactivate myofascial trigger points, with a 15/30 gauge solid filament needle, under aseptic technique. Indication(s): [x] Muscle spasms [x] Myalgia/Myositis  [] Muscle cramps      [] Muscle imbalances [] TMD (TMJ) [x] Myofascial pain & dysfunction     [] Other: __    Chart reviewed for the following:  Bettey AVELAR PT, have reviewed the medical history, summary list and precautions/contraindications for Scot Giraldo.     TIME OUT performed immediately prior to start of procedure:  502 (enter time the timeout was completed)  Bettye AVELAR PT, have performed the following reviews on Scot Enzo prior to the start of the session:      [x] Patient was identified by name and date of birth    [x] Agreement on all muscles being treated was verified   [x] Purpose of dry needling, side effects, possible complications, risks and benefits were explained to the patient   [x] Procedure site(s) verified  [x] Patient was positioned for comfort and draped for privacy  [x] Informed Consent was signed (initial visit) and verified verbally (subsequent visits)  [x] Patient was instructed on the need to report the use of blood thinners and/or immunosuppressant medications  [x] How to respond to possible adverse effects of treatment  [x] Self treatment of post needling soreness: ice, heat (moist heat, heat wraps) and stretching  [x] Opportunity was given to ask any questions, all questions were answered            Treatment:  The following muscles were treated today (needle insertion with with direct ESTIM):    Right: Subocc, MF- C3/ C6, UT   Left: Subocc     Patients response to todays treatment:   [x]  LTRs  [x]  Muscle Relaxation  []  Pain Relief  []  Decreased Tinnitus  []  Decreased HAs [x]  Post needling soreness []  Increased ROM   []  Other:           Post Treatment Pain Level (on 0 to 10) scale:   3  / 10     ASSESSMENT  Assessment/Changes in Function:     Good twitch    Good tolerance for DN     []  See Progress Note/Recertification   Patient will continue to benefit from skilled PT services to modify and progress therapeutic interventions, address functional mobility deficits, address ROM deficits, address strength deficits, analyze and address soft tissue restrictions and analyze and cue movement patterns to attain remaining goals.    Progress toward goals / Updated goals:    Reaching plateau     PLAN  [x]  Upgrade activities as tolerated YES  Continue plan of care   []  Discharge due to :    []  Other:      Therapist: Braulio Loera, PT    Date: 11/2/2020 Time: 5:02 PM     Future Appointments   Date Time Provider Jeannine Garcia   11/23/2020  5:00 PM Tree Shields, PT [facility]

## 2020-11-23 ENCOUNTER — APPOINTMENT (OUTPATIENT)
Dept: PHYSICAL THERAPY | Age: 30
End: 2020-11-23
Payer: COMMERCIAL

## 2021-02-04 ENCOUNTER — HOSPITAL ENCOUNTER (OUTPATIENT)
Dept: PHYSICAL THERAPY | Age: 31
Discharge: HOME | End: 2021-02-04
Payer: COMMERCIAL

## 2021-02-04 PROCEDURE — 97162 PT EVAL MOD COMPLEX 30 MIN: CPT

## 2021-02-04 PROCEDURE — 97032 APPL MODALITY 1+ESTIM EA 15: CPT

## 2021-02-04 PROCEDURE — 20561 NDL INSJ W/O NJX 3+ MUSC: CPT

## 2021-02-04 PROCEDURE — 97140 MANUAL THERAPY 1/> REGIONS: CPT
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
## 2021-02-04 NOTE — PROGRESS NOTES
PHYSICAL THERAPY - DAILY TREATMENT NOTE - DRY NEEDLE NOTE    Patient Name: Elizabeth Farah        Date: 2021  : 1990   YES  Patient  Verified  Visit #:   1     Insurance: Payor: BLUE CROSS / Plan: [facility] PPO / Product Type: PPO /      In time: 845 Out time: 955   Total Treatment Time: 70     Medicare Time Tracking (below)   Total Timed Codes (min):  30 1:1 Treatment Time:  60     TREATMENT AREA = Neck pain [M54.2]  Right shoulder pain [M25.511]    SUBJECTIVE  Pain Level (on 0 to 10 scale):  4  / 10   Medication Changes/New allergies or changes in medical history, any new surgeries or procedures?     NO    If yes, update Summary List   Subjective Functional Status/Changes:  []  No changes reported     See eval/ POC          OBJECTIVE  Modalities Rationale:     decrease pain, increase tissue extensibility, decrease trigger point density and increase stability/ ROM within muscle to improve patient's ability to perform ADLs/ return to PLOF  15 min [x] Estim, type/location: Direct using Pointer Excel II ( between 1-16 HZ) to DN                                     [x]  att     []  unatt     [x]  W/dry needling     []  w/ice    []  w/heat    min []  Mechanical Traction: type/lbs                   []  pro   []  sup   []  int   []  cont    []  before manual    []  after manual    min []  Ultrasound, settings/location:      min []  Iontophoresis w/ dexamethasone, location:                                               []  take home patch       []  in clinic   10 min []  Ice     [x]  Heat    location/position:     min []  Vasopneumatic Device, press/temp:     min []  Other:    [x] Skin assessment post-treatment (if applicable):    [x]  intact    [x]  redness- no adverse reaction     []redness  adverse reaction:        10 min Manual Therapy: Palpation, assessment of TP  -(needle insertion time not included)       manual therapy interventions were performed at a separate and distinct time from   the therapeutic activities interventions. Rationale:     decrease pain, increase tissue extensibility, decrease trigger point density and increase stability within muscle to improve patient's ability to perform ADLs/ return to PLOF    Select one untimed code below based on number of muscle groups needled:  []  CPT 38178:  needle insertion(s) without injection(s); 1 or 2 muscle(s)  [x]  CPT 22751:  needle insertion(s) without injection(s); 3 or more muscles. 5 Min--dry needle insertion   Rationale:      decrease pain, increase tissue extensibility, and decrease trigger points to improve patiets ability to perform ADLs    5 min Therapeutic Exercise:  [x]  See flow sheet   Modalities Rationale:     decrease pain, increase tissue extensibility, decrease trigger point density and increase stability within muscle to improve patient's ability to perform ADLs/ return to PLOF      Billed With/As:   [] TE   [x] MT   [] Neuro   [x] modalities Patient Education: [x] Review HEP    [] Progressed/Changed HEP based on:   [] positioning   [] body mechanics   [x] indication/ precautions/ expectations of DN    [] heat/ice application    [] other:          OTHER OBJECTIVE/FUNCTIONAL MEASURES:    Dry Needling Procedure Note    Dry Needle Session Number:  1    Procedure: An intramuscular manual therapy (dry needling) and a neuro-muscular re-education treatment was done to deactivate myofascial trigger points, with a 15/30 gauge solid filament needle, under aseptic technique. Indication(s): [x] Muscle spasms [x] Myalgia/Myositis  [] Muscle cramps      [] Muscle imbalances [] TMD (TMJ) [x] Myofascial pain & dysfunction     [] Other: __    Chart reviewed for the following:  IGera, PT, have reviewed the medical history, summary list and precautions/contraindications for Marco Ferrell.     TIME OUT performed immediately prior to start of procedure:  900 (enter time the timeout was completed)  Iris AVELAR Nicole Malave, have performed the following reviews on Conor Elnora prior to the start of the session:      [x] Patient was identified by name and date of birth    [x] Agreement on all muscles being treated was verified   [x] Purpose of dry needling, side effects, possible complications, risks and benefits were explained to the patient   [x] Procedure site(s) verified  [x] Patient was positioned for comfort and draped for privacy  [x] Informed Consent was signed (initial visit) and verified verbally (subsequent visits)  [x] Patient was instructed on the need to report the use of blood thinners and/or immunosuppressant medications  [x] How to respond to possible adverse effects of treatment  [x] Self treatment of post needling soreness: ice, heat (moist heat, heat wraps) and stretching  [x] Opportunity was given to ask any questions, all questions were answered            Treatment:  The following muscles were treated today (needle insertion with with direct ESTIM):    Right: UT, MF- c6   Left: UT, MF- c6     Patients response to todays treatment:   [x]  LTRs  [x]  Muscle Relaxation  []  Pain Relief  []  Decreased Tinnitus  []  Decreased HAs [x]  Post needling soreness []  Increased ROM   []  Other:           Post Treatment Pain Level (on 0 to 10) scale:   4  / 10     ASSESSMENT  Assessment/Changes in Function:     See eval/ POC     []  See Progress Note/Recertification   Patient will continue to benefit from skilled PT services to modify and progress therapeutic interventions, address functional mobility deficits, address ROM deficits, address strength deficits, analyze and address soft tissue restrictions and analyze and cue movement patterns to attain remaining goals.    Progress toward goals / Updated goals:    See eval/ POC     PLAN  [x]  Upgrade activities as tolerated YES  Continue plan of care   []  Discharge due to :    []  Other:      Therapist: Alexandre Mir, PT    Date: 2/4/2021 Time: 9:42 AM No future appointments.

## 2021-02-09 ENCOUNTER — HOSPITAL ENCOUNTER (OUTPATIENT)
Dept: PHYSICAL THERAPY | Age: 31
Discharge: HOME | End: 2021-02-09
Payer: COMMERCIAL

## 2021-02-09 PROCEDURE — 97140 MANUAL THERAPY 1/> REGIONS: CPT

## 2021-02-09 PROCEDURE — 97014 ELECTRIC STIMULATION THERAPY: CPT

## 2021-02-09 PROCEDURE — 97110 THERAPEUTIC EXERCISES: CPT

## 2021-02-09 NOTE — PROGRESS NOTES
PHYSICAL THERAPY - DAILY TREATMENT NOTE    Patient Name: Eloies Hernandez        Date: 2021  : 1990    Patient  Verified: YES  Visit #:   2   of   12  Insurance: Payor: BLUE CROSS / Plan: [facility] PPO / Product Type: PPO /      In time: 7:00 Out time: 7:40   Total Treatment Time: 40     Medicare Time Tracking (below)   Total Timed Codes (min):  - 1:1 Treatment Time:  -     TREATMENT AREA/ DIAGNOSIS = Neck pain [M54.2]  Right shoulder pain [M25.511]    SUBJECTIVE  Pain Level (on 0 to 10 scale):  3  / 10   Medication Changes/New allergies or changes in medical history, any new surgeries or procedures?     NO    If yes, update Summary List   Subjective Functional Status/Changes:  []  No changes reported     Pt reports he feels much better after the dry needling last visit      OBJECTIVE  Modalities Rationale:     decrease pain and increase tissue extensibility to improve patient's ability to perform ADLs without pain    10 min [x] Estim, type/location:  IFC                                    []  att     []  unatt     []  w/US     []  w/ice    []  w/heat    min []  Mechanical Traction: type/lbs                   []  pro   []  sup   []  int   []  cont    []  before manual    []  after manual    min []  Ultrasound, settings/location:      min []  Iontophoresis w/ dexamethasone, location:                                               []  take home patch       []  in clinic   - min []  Ice     [x]  Heat    location/position: With IFC    min []  Vasopneumatic Device, press/temp:     min []  Other:    [] Skin assessment post-treatment (if applicable):    []  intact    []  redness- no adverse reaction     []redness  adverse reaction:        15 min Therapeutic Exercise:  [x]  See flow sheet   Rationale:      increase ROM and increase strength to improve the patients ability to perform ADLs without pain       15 min Manual Therapy: STM to c/s   Rationale:      decrease pain, increase ROM and increase tissue extensibility to improve patient's ability to perform ADLs without pain  The manual therapy interventions were performed at a separate and distinct time from the therapeutic activities interventions      Billed With/As:   [x] TE   [] TA   [] Neuro   [] Self Care Patient Education: [x] Review HEP    [] Progressed/Changed HEP based on:   [] positioning   [] body mechanics   [] transfers   [] heat/ice application    [] other:        Other Objective/Functional Measures:    TTP to c/s   Post Treatment Pain Level (on 0 to 10) scale:   2  / 10     ASSESSMENT  Assessment/Changes in Function:     Pt had no increase in pain with therapeutic exercises      []  See Progress Note/Recertification   Patient will continue to benefit from skilled PT services to modify and progress therapeutic interventions, address functional mobility deficits, address ROM deficits, address strength deficits, analyze and address soft tissue restrictions and analyze and cue movement patterns to attain remaining goals.    Progress toward goals / Updated goals:    No progress toward goals, just initiated plan of care     PLAN  [x]  Upgrade activities as tolerated YES Continue plan of care   []  Discharge due to :    []  Other:      Therapist: Gissell Shin DPT     Date: 2/9/2021 Time: 7:35 AM        Future Appointments   Date Time Provider Jeannine Garcia   2/11/2021  8:00 AM Chang Hess PT [facility]   2/17/2021  8:00 AM Chang Hess PT [facility]   2/19/2021  8:00 AM Mary Cavazos PT [facility]   2/22/2021  8:15 AM Osmar Carney PTA [facility]   2/24/2021  8:00 AM Chang Hess PT [facility]

## 2021-02-11 ENCOUNTER — HOSPITAL ENCOUNTER (OUTPATIENT)
Dept: PHYSICAL THERAPY | Age: 31
Discharge: HOME | End: 2021-02-11
Payer: COMMERCIAL

## 2021-02-11 PROCEDURE — 97032 APPL MODALITY 1+ESTIM EA 15: CPT

## 2021-02-11 PROCEDURE — 97110 THERAPEUTIC EXERCISES: CPT

## 2021-02-11 PROCEDURE — 20561 NDL INSJ W/O NJX 3+ MUSC: CPT

## 2021-02-11 PROCEDURE — 97140 MANUAL THERAPY 1/> REGIONS: CPT

## 2021-02-11 NOTE — PROGRESS NOTES
PHYSICAL THERAPY - DAILY TREATMENT NOTE - DRY NEEDLE NOTE    Patient Name: Tico Lyons        Date: 2021  : 1990   YES  Patient  Verified  Visit #:   3   of   12  Insurance: Payor: Donovan Reynolds / Plan: [facility] PPO / Product Type: PPO /      In time: 800 Out time: 900   Total Treatment Time: 55     Medicare Time Tracking (below)   Total Timed Codes (min):  40 1:1 Treatment Time:  45     TREATMENT AREA = Neck pain [M54.2]  Right shoulder pain [M25.511]    SUBJECTIVE  Pain Level (on 0 to 10 scale):  5  / 10   Medication Changes/New allergies or changes in medical history, any new surgeries or procedures?     NO    If yes, update Summary List   Subjective Functional Status/Changes:  []  No changes reported     R shoulder is painful  Did well after DN          OBJECTIVE  Modalities Rationale:     decrease pain, increase tissue extensibility, decrease trigger point density and increase stability/ ROM within muscle to improve patient's ability to perform ADLs/ return to PLOF  15 min [x] Estim, type/location: Direct using Pointer Excel II ( between 1-16 HZ) to DN                                     [x]  att     []  unatt     [x]  W/dry needling     []  w/ice    []  w/heat    min []  Mechanical Traction: type/lbs                   []  pro   []  sup   []  int   []  cont    []  before manual    []  after manual    min []  Ultrasound, settings/location:      min []  Iontophoresis w/ dexamethasone, location:                                               []  take home patch       []  in clinic   10 min []  Ice     [x]  Heat    location/position:     min []  Vasopneumatic Device, press/temp:     min []  Other:    [x] Skin assessment post-treatment (if applicable):    [x]  intact    [x]  redness- no adverse reaction     []redness  adverse reaction:        10 min Manual Therapy: Palpation, assessment of TP  -(needle insertion time not included)       manual therapy interventions were performed at a separate and distinct time from   the therapeutic activities interventions.    Rationale:     decrease pain, increase tissue extensibility, decrease trigger point density and increase stability within muscle to improve patient's ability to perform ADLs/ return to PLOF    Select one untimed code below based on number of muscle groups needled:  []  CPT 15150:  needle insertion(s) without injection(s); 1 or 2 muscle(s)  [x]  CPT 89010:  needle insertion(s) without injection(s); 3 or more muscles.   5 Min--dry needle insertion   Rationale:      decrease pain, increase tissue extensibility, and decrease trigger points to improve patiets ability to perform ADLs    15 min Therapeutic Exercise:  [x]  See flow sheet   Modalities Rationale:     decrease pain, increase tissue extensibility, decrease trigger point density and increase stability within muscle to improve patient's ability to perform ADLs/ return to PLOF      Billed With/As:   [] TE   [x] MT   [] Neuro   [x] modalities Patient Education: [x] Review HEP    [] Progressed/Changed HEP based on:   [] positioning   [] body mechanics   [x] indication/ precautions/ expectations of DN    [] heat/ice application    [] other:          OTHER OBJECTIVE/FUNCTIONAL MEASURES:    Dry Needling Procedure Note    Dry Needle Session Number:  2    Procedure:   An intramuscular manual therapy (dry needling) and a neuro-muscular re-education treatment was done to deactivate myofascial trigger points, with a 15/30 gauge solid filament needle, under aseptic technique.    Indication(s): [x] Muscle spasms [x] Myalgia/Myositis  [] Muscle cramps      [] Muscle imbalances [] TMD (TMJ) [x] Myofascial pain & dysfunction     [] Other: __    Chart reviewed for the following:  ICathleen PT, have reviewed the medical history, summary list and precautions/contraindications for Dwayne Buckner.    TIME OUT performed immediately prior to start of procedure:  805 (enter time the timeout was  completed)  Vinayak AVELAR, PT, have performed the following reviews on Quiana Terrazas prior to the start of the session:      [x] Patient was identified by name and date of birth    [x] Agreement on all muscles being treated was verified   [x] Purpose of dry needling, side effects, possible complications, risks and benefits were explained to the patient   [x] Procedure site(s) verified  [x] Patient was positioned for comfort and draped for privacy  [x] Informed Consent was signed (initial visit) and verified verbally (subsequent visits)  [x] Patient was instructed on the need to report the use of blood thinners and/or immunosuppressant medications  [x] How to respond to possible adverse effects of treatment  [x] Self treatment of post needling soreness: ice, heat (moist heat, heat wraps) and stretching  [x] Opportunity was given to ask any questions, all questions were answered            Treatment:  The following muscles were treated today (needle insertion with with direct ESTIM):    Right: UT, LS, subocc, MF- C3/C6   Left: UT     Patients response to todays treatment:   [x]  LTRs  [x]  Muscle Relaxation  []  Pain Relief  []  Decreased Tinnitus  []  Decreased HAs [x]  Post needling soreness []  Increased ROM   []  Other:           Post Treatment Pain Level (on 0 to 10) scale:   4  / 10     ASSESSMENT  Assessment/Changes in Function:     Pt reports improvement following DN     []  See Progress Note/Recertification   Patient will continue to benefit from skilled PT services to modify and progress therapeutic interventions, address functional mobility deficits, address ROM deficits, address strength deficits, analyze and address soft tissue restrictions and analyze and cue movement patterns to attain remaining goals.    Progress toward goals / Updated goals:    Dec pain intensity     PLAN  [x]  Upgrade activities as tolerated YES  Continue plan of care   []  Discharge due to :    []  Other:      Therapist: Ernesto Hines, PT    Date: 2/11/2021 Time: 8:05 AM     Future Appointments   Date Time Provider Jeannine Garcia   2/17/2021  8:00 AM Muriel Kaur, PT [place] [id number] [address]   2/19/2021  8:00 AM Joshua Rosales PT [place] [id number] [address]   2/22/2021  8:15 AM Neo Amato PTA [place] [id number] [address]   2/24/2021  8:00 AM Muriel Kaur, [facility] [place] [id number] [address]

## 2021-02-17 ENCOUNTER — HOSPITAL ENCOUNTER (OUTPATIENT)
Dept: PHYSICAL THERAPY | Age: 31
Discharge: HOME | End: 2021-02-17
Payer: COMMERCIAL

## 2021-02-17 PROCEDURE — 20561 NDL INSJ W/O NJX 3+ MUSC: CPT

## 2021-02-17 PROCEDURE — 97032 APPL MODALITY 1+ESTIM EA 15: CPT

## 2021-02-17 PROCEDURE — 97110 THERAPEUTIC EXERCISES: CPT

## 2021-02-17 PROCEDURE — 97140 MANUAL THERAPY 1/> REGIONS: CPT

## 2021-02-17 NOTE — PROGRESS NOTES
PHYSICAL THERAPY - DAILY TREATMENT NOTE - DRY NEEDLE NOTE    Patient Name: Sarah Segovia        Date: 2021  : 1990   YES  Patient  Verified  Visit #:      12  Insurance: Payor: Florinda Olivares / Plan: [facility] PPO / Product Type: PPO /      In time: 805 Out time: 905   Total Treatment Time: 55     Medicare Time Tracking (below)   Total Timed Codes (min):  40 1:1 Treatment Time:  45     TREATMENT AREA = Neck pain [M54.2]  Right shoulder pain [M25.511]    SUBJECTIVE  Pain Level (on 0 to 10 scale):  5  / 10   Medication Changes/New allergies or changes in medical history, any new surgeries or procedures? NO    If yes, update Summary List   Subjective Functional Status/Changes:  []  No changes reported     Sore after last visit.   Getting better except that 1 spot in the right shoulder area          OBJECTIVE  Modalities Rationale:     decrease pain, increase tissue extensibility, decrease trigger point density and increase stability/ ROM within muscle to improve patient's ability to perform ADLs/ return to PLOF  15 min [x] Estim, type/location: Direct using Pointer Excel II ( between 1-16 HZ) to DN                                     [x]  att     []  unatt     [x]  W/dry needling     []  w/ice    []  w/heat    min []  Mechanical Traction: type/lbs                   []  pro   []  sup   []  int   []  cont    []  before manual    []  after manual    min []  Ultrasound, settings/location:      min []  Iontophoresis w/ dexamethasone, location:                                               []  take home patch       []  in clinic   10 min []  Ice     [x]  Heat    location/position:     min []  Vasopneumatic Device, press/temp:     min []  Other:    [x] Skin assessment post-treatment (if applicable):    [x]  intact    [x]  redness- no adverse reaction     []redness  adverse reaction:        10 min Manual Therapy: Palpation, assessment of TP  -(needle insertion time not included) manual therapy interventions were performed at a separate and distinct time from   the therapeutic activities interventions. Rationale:     decrease pain, increase tissue extensibility, decrease trigger point density and increase stability within muscle to improve patient's ability to perform ADLs/ return to PLOF    Select one untimed code below based on number of muscle groups needled:  []  CPT 09442:  needle insertion(s) without injection(s); 1 or 2 muscle(s)  [x]  CPT 13324:  needle insertion(s) without injection(s); 3 or more muscles. 5 Min--dry needle insertion   Rationale:      decrease pain, increase tissue extensibility, and decrease trigger points to improve patiets ability to perform ADLs    15 min Therapeutic Exercise:  [x]  See flow sheet   Modalities Rationale:     decrease pain, increase tissue extensibility, decrease trigger point density and increase stability within muscle to improve patient's ability to perform ADLs/ return to PLOF      Billed With/As:   [] TE   [x] MT   [] Neuro   [x] modalities Patient Education: [x] Review HEP    [] Progressed/Changed HEP based on:   [] positioning   [] body mechanics   [x] indication/ precautions/ expectations of DN    [] heat/ice application    [] other:          OTHER OBJECTIVE/FUNCTIONAL MEASURES:    Dry Needling Procedure Note    Dry Needle Session Number:  3    Procedure: An intramuscular manual therapy (dry needling) and a neuro-muscular re-education treatment was done to deactivate myofascial trigger points, with a 15/30 gauge solid filament needle, under aseptic technique. Indication(s): [x] Muscle spasms [x] Myalgia/Myositis  [] Muscle cramps      [] Muscle imbalances [] TMD (TMJ) [x] Myofascial pain & dysfunction     [] Other: __    Chart reviewed for the following:  IGabriel PT, have reviewed the medical history, summary list and precautions/contraindications for Isak Degroot.     TIME OUT performed immediately prior to start of procedure:  810 (enter time the timeout was completed)  Ernesto AVELAR, PT, have performed the following reviews on Vazquez Meier prior to the start of the session:      [x] Patient was identified by name and date of birth    [x] Agreement on all muscles being treated was verified   [x] Purpose of dry needling, side effects, possible complications, risks and benefits were explained to the patient   [x] Procedure site(s) verified  [x] Patient was positioned for comfort and draped for privacy  [x] Informed Consent was signed (initial visit) and verified verbally (subsequent visits)  [x] Patient was instructed on the need to report the use of blood thinners and/or immunosuppressant medications  [x] How to respond to possible adverse effects of treatment  [x] Self treatment of post needling soreness: ice, heat (moist heat, heat wraps) and stretching  [x] Opportunity was given to ask any questions, all questions were answered            Treatment:  The following muscles were treated today (needle insertion with with direct ESTIM):    Right: UT x2, LS, MF- C3/6   Left: UT     Patients response to todays treatment:   [x]  LTRs  [x]  Muscle Relaxation  []  Pain Relief  []  Decreased Tinnitus  []  Decreased HAs [x]  Post needling soreness []  Increased ROM   []  Other:           Post Treatment Pain Level (on 0 to 10) scale:   4  / 10     ASSESSMENT  Assessment/Changes in Function:     Added prone retract, W, Y for scap stability     []  See Progress Note/Recertification   Patient will continue to benefit from skilled PT services to modify and progress therapeutic interventions, address functional mobility deficits, address ROM deficits, address strength deficits, analyze and address soft tissue restrictions and analyze and cue movement patterns to attain remaining goals.    Progress toward goals / Updated goals:    Localized UT pain     PLAN  [x]  Upgrade activities as tolerated YES  Continue plan of care []  Discharge due to :    []  Other:      Therapist: Radha Fajardo PT    Date: 2/17/2021 Time: 8:06 AM     Future Appointments   Date Time Provider Jeannine Garcia   2/19/2021  7:30 AM Christi Mancuso PTA [facility]   2/23/2021  7:00 AM Sarah Bland [facility]   2/24/2021  8:00 AM Yonatan Wolff PT [facility]

## 2021-02-19 ENCOUNTER — HOSPITAL ENCOUNTER (OUTPATIENT)
Dept: PHYSICAL THERAPY | Age: 31
Discharge: HOME | End: 2021-02-19
Payer: COMMERCIAL

## 2021-02-19 PROCEDURE — 97110 THERAPEUTIC EXERCISES: CPT

## 2021-02-19 PROCEDURE — 97140 MANUAL THERAPY 1/> REGIONS: CPT

## 2021-02-19 PROCEDURE — 97014 ELECTRIC STIMULATION THERAPY: CPT

## 2021-02-19 NOTE — PROGRESS NOTES
PHYSICAL THERAPY - DAILY TREATMENT NOTE    Patient Name: Isak Degroot        Date: 2021  : 1990   YES Patient  Verified  Visit #:      of   12  Insurance: Payor: Ivania Orta / Plan: [facility] PPO / Product Type: PPO /      In time: 7:31  Out time: 8:23    Total Treatment Time: 52     Medicare Time /BCBS Tracking (below)   Total Timed Codes (min):  42 1:1 Treatment Time: 42     TREATMENT AREA =  Neck pain [M54.2]  Right shoulder pain [M25.511]    SUBJECTIVE  Pain Level (on 0 to 10 scale): 4  / 10-C/S R UT   Medication Changes/New allergies or changes in medical history, any new surgeries or procedures? NO    If yes, update Summary List   Subjective Functional Status/Changes:  []  No changes reported     \"The dry needling helped\"  Pt performing RRIS 1x per day         OBJECTIVE  Modalities Rationale:     decrease edema, decrease inflammation, decrease pain and increase tissue extensibility to improve patient's ability to perform overhead activities  10 min [x] Estim, type/location: IFC C/S UT                                     []  att     [x]  unatt     []  w/US     []  w/ice    [x]  w/heat    min []  Mechanical Traction: type/lbs                   []  pro   []  sup   []  int   []  cont    []  before manual    []  after manual    min []  Ultrasound, settings/location:      min []  Iontophoresis w/ dexamethasone, location:                                               []  take home patch       []  in clinic    min []  Ice     []  Heat    location/position:     min []  Vasopneumatic Device, press/temp:     min []  Other:    [x] Skin assessment post-treatment (if applicable):    [x]  intact    []  redness- no adverse reaction     []redness  adverse reaction:        32 min Therapeutic Exercise:  [x]  See flow sheet   Rationale:      increase ROM and increase strength to improve the patients ability to performing functional ADLs     10 min Manual Therapy:   The manual therapy interventions were performed at a separate and distinct time from the therapeutic activities interventions Gentle manual C/S traction; STM/DTM to bilateral cervical , UT , Davidson Ruff , and pec release R>L    Rationale:      decrease pain, increase ROM and increase tissue extensibility to improve patient's ability to improve tissue mobility in looking up and to the side        withTE min Patient Education:  YES  Reviewed HEP, neutral spine posture in sitting, reaching body mechanics   []  Progressed/Changed HEP based on: Other Objective/Functional Measures: Add levator stretch 2x 30 sec, prone T's 10x      Post Treatment Pain Level (on 0 to 10) scale:   3  / 10     ASSESSMENT  Assessment/Changes in Function:   Recommended pt to perform self stretching more frequently in day as tolerated, pt education in self TrPt release for UT, Lev, Rhomboids  Moderate mm tone TrPt tenderness noted in cervical paraspinals R>L -improving tissue mobility and decreased pain after release   []  See Progress Note/Recertification   Patient will continue to benefit from skilled PT services to modify and progress therapeutic interventions, address functional mobility deficits, address ROM deficits, address strength deficits, assess and modify postural abnormalities and instruct in home and community integration to attain remaining goals.    Progress toward goals / Updated goals:  STG #2 currently in progress     PLAN  []  Upgrade activities as tolerated YES Continue plan of care   []  Discharge due to :    []  Other:      Therapist: Rizwan Lee PTA    Date: 2/19/2021 Time: 8:23  AM     Future Appointments   Date Time Provider Jeannine Garcia   2/23/2021  7:00 AM Bartolo Blanc [facility]   2/24/2021  8:00 AM Laya Marinelli PT [facility]

## 2021-02-22 ENCOUNTER — APPOINTMENT (OUTPATIENT)
Dept: PHYSICAL THERAPY | Age: 31
End: 2021-02-22
Payer: COMMERCIAL

## 2021-02-22 NOTE — PROGRESS NOTES
[facility] PHYSICAL THERAPY AT [address] [place] [address], [address] [place] [address], [address], [address] [place] [address]  Phone: [phone]  Fax: [phone]  DISCHARGE NOTE            Patient Name: Anaid Noe : 1990   Treatment/Medical Diagnosis: Neck pain [M54.2]   Referral Source: Maxx Saldivar MD       Date of Initial Visit: 3/5/2020 Attended Visits: 24 Missed Visits: 6      SUMMARY OF TREATMENT  PT consisted of manual therapy techniques, therapeutic exercises, and modalities to improve strength, improve mobility, decrease pain, and improve overall function. Recent treatment has included dry needling     CURRENT STATUS  Patient has reached plateau in progress with PT. Pt still having pain on the right side of the spine with looking to the right.               Goal/Measure of Progress Goal Met? 1.   Pt will report <=2/10 pain to cervical spine with performance of functional spinal mobility and rotation   Status at last Eval: 2-3 Current Status: 3- no   2. The patient will be independent in final, advanced HEP for long-term management of symptoms   Status at last Eval: Still progressing Current Status: independent yes   3. Patient will demonstrate functional and nonpainful B AROM for cervical rotation and extension in seated and standing testing to improve work function   Status at last Eval: painful Current Status: painfull no        RECOMMENDATIONS  Discharge from PT due to lack of recent progress     If you have any questions/comments please contact us directly at [phone].    Thank you for allowing us to assist in the care of your patient.     Therapist Signature: Jyotsna Joya PT Date: 21       Time: 8:40 am

## 2021-02-23 ENCOUNTER — HOSPITAL ENCOUNTER (OUTPATIENT)
Dept: PHYSICAL THERAPY | Age: 31
Discharge: HOME | End: 2021-02-23
Payer: COMMERCIAL

## 2021-02-23 PROCEDURE — 97140 MANUAL THERAPY 1/> REGIONS: CPT

## 2021-02-23 PROCEDURE — 97110 THERAPEUTIC EXERCISES: CPT

## 2021-02-23 PROCEDURE — 97014 ELECTRIC STIMULATION THERAPY: CPT

## 2021-02-23 NOTE — PROGRESS NOTES
PHYSICAL THERAPY - DAILY TREATMENT NOTE    Patient Name: Samantha Perdomo        Date: 2021  : 1990    Patient  Verified: YES  Visit #:   6   of   12  Insurance: Payor: [employer] / Plan: [facility] PPO / Product Type: PPO /      In time: 7:00 Out time: 7:40   Total Treatment Time: 40     Medicare Time Tracking (below)   Total Timed Codes (min):  - 1:1 Treatment Time:  -     TREATMENT AREA/ DIAGNOSIS = Neck pain [M54.2]  Right shoulder pain [M25.511]    SUBJECTIVE  Pain Level (on 0 to 10 scale):  4  / 10   Medication Changes/New allergies or changes in medical history, any new surgeries or procedures?     NO    If yes, update Summary List   Subjective Functional Status/Changes:  []  No changes reported     Pt reports pain with rotation on the R side      OBJECTIVE  Modalities Rationale:     decrease inflammation and decrease pain to improve patient's ability to perform ADLs without pain    10 min [x] Estim, type/location: IFC                                    []  att     []  unatt     []  w/US     []  w/ice    []  w/heat    min []  Mechanical Traction: type/lbs                   []  pro   []  sup   []  int   []  cont    []  before manual    []  after manual    min []  Ultrasound, settings/location:      min []  Iontophoresis w/ dexamethasone, location:                                               []  take home patch       []  in clinic   - min []  Ice     [x]  Heat    location/position: With IFC to neck    min []  Vasopneumatic Device, press/temp:     min []  Other:    [] Skin assessment post-treatment (if applicable):    []  intact    []  redness- no adverse reaction     []redness  adverse reaction:        20 min Therapeutic Exercise:  [x]  See flow sheet   Rationale:      increase ROM and increase strength to improve the patients ability to perform ADLs without pain       10 min Manual Therapy: STM to c/s   Rationale:      decrease pain, increase ROM and increase tissue extensibility to improve patient's ability to perform ADLs without pain  The manual therapy interventions were performed at a separate and distinct time from the therapeutic activities interventions      Billed With/As:   [x] TE   [] TA   [] Neuro   [] Self Care Patient Education: [x] Review HEP    [] Progressed/Changed HEP based on:   [] positioning   [] body mechanics   [] transfers   [] heat/ice application    [] other:        Other Objective/Functional Measures:    TTP to c/s musculature    Post Treatment Pain Level (on 0 to 10) scale:   0  / 10     ASSESSMENT  Assessment/Changes in Function:     Pt had improved pain free ROM after manual therapy techniques      []  See Progress Note/Recertification   Patient will continue to benefit from skilled PT services to modify and progress therapeutic interventions, address functional mobility deficits, address ROM deficits, address strength deficits, analyze and address soft tissue restrictions and analyze and cue movement patterns to attain remaining goals.    Progress toward goals / Updated goals:    Good Progress to    [] STG    [x] LTG  1 as shown by improved pain free mobility     PLAN  [x]  Upgrade activities as tolerated YES Continue plan of care   []  Discharge due to :    []  Other:      Therapist: Sandra Claire DPT     Date: 2/23/2021 Time: 8:40 AM        Future Appointments   Date Time Provider Jeannine Garcia   2/24/2021  8:00 AM Chelsea Piper PT [facility]

## 2021-02-24 ENCOUNTER — HOSPITAL ENCOUNTER (OUTPATIENT)
Dept: PHYSICAL THERAPY | Age: 31
Discharge: HOME | End: 2021-02-24
Payer: COMMERCIAL

## 2021-02-24 PROCEDURE — 97032 APPL MODALITY 1+ESTIM EA 15: CPT

## 2021-02-24 PROCEDURE — 20561 NDL INSJ W/O NJX 3+ MUSC: CPT

## 2021-02-24 PROCEDURE — 97140 MANUAL THERAPY 1/> REGIONS: CPT

## 2021-02-24 PROCEDURE — 97110 THERAPEUTIC EXERCISES: CPT

## 2021-02-24 NOTE — PROGRESS NOTES
PHYSICAL THERAPY - DAILY TREATMENT NOTE - DRY NEEDLE NOTE    Patient Name: Isabel Jiemnez        Date: 2021  : 1990   YES  Patient  Verified  Visit #:     Insurance: Payor: Janna Sessions / Plan: [facility] PPO / Product Type: PPO /      In time: 800 Out time: 900   Total Treatment Time: 55     Medicare Time Tracking (below)   Total Timed Codes (min):  40 1:1 Treatment Time:  45     TREATMENT AREA = Neck pain [M54.2]  Right shoulder pain [M25.511]    SUBJECTIVE  Pain Level (on 0 to 10 scale):  3  / 10   Medication Changes/New allergies or changes in medical history, any new surgeries or procedures? NO    If yes, update Summary List   Subjective Functional Status/Changes:  []  No changes reported     Pain seems to have moved into UT R    Feel a lot better. Have full motion.   R UT pain with turning right    Received TNS unit     Overall- 70% improved         OBJECTIVE  Modalities Rationale:     decrease pain, increase tissue extensibility, decrease trigger point density and increase stability/ ROM within muscle to improve patient's ability to perform ADLs/ return to PLOF  15 min [x] Estim, type/location: Direct using Pointer Excel II ( between 1-16 HZ) to DN                                     [x]  att     []  unatt     [x]  W/dry needling     []  w/ice    []  w/heat    min []  Mechanical Traction: type/lbs                   []  pro   []  sup   []  int   []  cont    []  before manual    []  after manual    min []  Ultrasound, settings/location:      min []  Iontophoresis w/ dexamethasone, location:                                               []  take home patch       []  in clinic   10 min []  Ice     [x]  Heat    location/position:     min []  Vasopneumatic Device, press/temp:     min []  Other:    [x] Skin assessment post-treatment (if applicable):    [x]  intact    [x]  redness- no adverse reaction     []redness  adverse reaction:        10 min Manual Therapy: Palpation, assessment of TP  -(needle insertion time not included)       manual therapy interventions were performed at a separate and distinct time from   the therapeutic activities interventions. Rationale:     decrease pain, increase tissue extensibility, decrease trigger point density and increase stability within muscle to improve patient's ability to perform ADLs/ return to PLOF    Select one untimed code below based on number of muscle groups needled:  []  CPT 32109:  needle insertion(s) without injection(s); 1 or 2 muscle(s)  [x]  CPT 87826:  needle insertion(s) without injection(s); 3 or more muscles. 5 Min--dry needle insertion   Rationale:      decrease pain, increase tissue extensibility, and decrease trigger points to improve patiets ability to perform ADLs    15 min Therapeutic Exercise:  [x]  See flow sheet   Modalities Rationale:     decrease pain, increase tissue extensibility, decrease trigger point density and increase stability within muscle to improve patient's ability to perform ADLs/ return to PLOF      Billed With/As:   [] TE   [x] MT   [] Neuro   [x] modalities Patient Education: [x] Review HEP    [] Progressed/Changed HEP based on:   [] positioning   [] body mechanics   [x] indication/ precautions/ expectations of DN    [] heat/ice application    [] other:          OTHER OBJECTIVE/FUNCTIONAL MEASURES:    Dry Needling Procedure Note    Dry Needle Session Number:  4  Procedure: An intramuscular manual therapy (dry needling) and a neuro-muscular re-education treatment was done to deactivate myofascial trigger points, with a 15/30 gauge solid filament needle, under aseptic technique.     Indication(s): [x] Muscle spasms [x] Myalgia/Myositis  [] Muscle cramps      [] Muscle imbalances [] TMD (TMJ) [x] Myofascial pain & dysfunction     [] Other: __    Chart reviewed for the following:  IKrishna PT, have reviewed the medical history, summary list and precautions/contraindications for Elizabeth Farah. TIME OUT performed immediately prior to start of procedure:  805 (enter time the timeout was completed)  IJune, PT, have performed the following reviews on Elizabeth Farah prior to the start of the session:      [x] Patient was identified by name and date of birth    [x] Agreement on all muscles being treated was verified   [x] Purpose of dry needling, side effects, possible complications, risks and benefits were explained to the patient   [x] Procedure site(s) verified  [x] Patient was positioned for comfort and draped for privacy  [x] Informed Consent was signed (initial visit) and verified verbally (subsequent visits)  [x] Patient was instructed on the need to report the use of blood thinners and/or immunosuppressant medications  [x] How to respond to possible adverse effects of treatment  [x] Self treatment of post needling soreness: ice, heat (moist heat, heat wraps) and stretching  [x] Opportunity was given to ask any questions, all questions were answered            Treatment:  The following muscles were treated today (needle insertion with with direct ESTIM):    Right: UT x 2, MF- C5   Left: UT, MF- C5     Patients response to todays treatment:   [x]  LTRs  [x]  Muscle Relaxation  []  Pain Relief  []  Decreased Tinnitus  []  Decreased HAs [x]  Post needling soreness []  Increased ROM   []  Other:           Post Treatment Pain Level (on 0 to 10) scale:   2-3  / 10     ASSESSMENT  Assessment/Changes in Function:     See PN     []  See Progress Note/Recertification   Patient will continue to benefit from skilled PT services to modify and progress therapeutic interventions, address functional mobility deficits, address ROM deficits, address strength deficits, analyze and address soft tissue restrictions and analyze and cue movement patterns to attain remaining goals.    Progress toward goals / Updated goals:    See PN     PLAN  [x]  Upgrade activities as tolerated YES Continue plan of care   []  Discharge due to :    []  Other:      Therapist: Chris Coulter, PT    Date: 2/24/2021 Time: 8:04 AM     No future appointments.

## 2021-02-24 NOTE — PROGRESS NOTES
[facility] PHYSICAL THERAPY AT [address] [facility], [address] [place] [address] Way, [address], [address] [place] [address]  Phone: [phone]  Fax: [phone]  PROGRESS NOTE  Patient Name: Ruchi Reyez : 1990   Treatment/Medical Diagnosis: Neck pain [M54.2]  Right shoulder pain [M25.511]   Referral Source: Aliyah Giraldo MD     Date of Initial Visit: 21 Attended Visits: 7 Missed Visits:      SUMMARY OF TREATMENT  PT has consisted of dry needling with EStim, manual therapy, therapeutic exercise, patient education of HEP. He has received a TNS unit for home use. CURRENT STATUS  Patient reports 70% overall improvement. He states that current pain is localized in the R UT region. ROM is WNLs, but he has discomfort in the R UT region with rotation. Previous Goals:  1 Patient to report >= 75% improvement in symptoms with ADLs. 2 Patient will be independent with finalized HEP/ self maintenance. 3 Increase FOTO score >= 65% to indicate improved function with use of CS.  4 Restore full AROM for improved ADL participation. Prior Level/Current Level:  1) Prior Level: na   Current Level: 70%   Goal Met? progressing  2) Prior Level: na   Current Level: patient has been provided HEP    Goal Met? Partially met  3) Prior Level: 47   Current Level: 68   Goal Met? yes  4) Prior Level: Cx ROM: flex= 38, ext= 37, R/L rot= 55/57 degrees   Current Level: WNLs, but pain with R rotation   Goal Met? Partially met    New Goals to be achieved in __2-4__  weeks:  Continue LTGs as stated above    Assessments/Recommendations: Other: continue PT per current POC 2x/week for 2-4     If you have any questions/comments please contact us directly at [phone]. Thank you for allowing us to assist in the care of your patient.     Therapist Signature: Oni Lombardi PT Date: 2021   Reporting Period:  Certification Period:      Time: 9:34 AM   NOTE TO PHYSICIAN:  Via Nikko Bains  AND FAX TO   [facility] [place]: [phone]. If you are unable to process this request in 24 hours please contact our office: [phone].    ___ I have read the above report and request that my patient continue as recommended.   ___ I have read the above report and request that my patient continue therapy with the following changes/special instructions:_________________________________________   ___ I have read the above report and request that my patient be discharged from therapy.      Physician Signature:        Date:       Time:       Yee Mandujano MD

## 2021-03-02 ENCOUNTER — APPOINTMENT (OUTPATIENT)
Dept: PHYSICAL THERAPY | Age: 31
End: 2021-03-02

## 2021-03-04 ENCOUNTER — APPOINTMENT (OUTPATIENT)
Dept: PHYSICAL THERAPY | Age: 31
End: 2021-03-04

## 2021-03-09 ENCOUNTER — APPOINTMENT (OUTPATIENT)
Dept: PHYSICAL THERAPY | Age: 31
End: 2021-03-09

## 2021-03-11 ENCOUNTER — APPOINTMENT (OUTPATIENT)
Dept: PHYSICAL THERAPY | Age: 31
End: 2021-03-11

## 2021-03-22 NOTE — PROGRESS NOTES
[facility] PHYSICAL THERAPY AT [address] [facility], [address] [place] [address], [address], [address] [place] [address]  Phone: [phone]  Fax: [phone]  PT DISCHARGE NOTE            Patient Name: Blaine Kruger : 1990   Treatment/Medical Diagnosis: Neck pain [M54.2]  Right shoulder pain [M25.511]   Referral Source: Raj Ayon MD       Date of Initial Visit: 21 Attended Visits: 7 Missed Visits:        SUMMARY OF TREATMENT  PT has consisted of dry needling with EStim, manual therapy, therapeutic exercise, patient education of HEP. He has received a TNS unit for home use.     CURRENT STATUS  Patient did not return to PT following re-assessment dated 21. Progress at that time:  Pt reports 70% overall improvement. He states that current pain is localized in the R UT region. ROM is WNLs, but he has discomfort in the R UT region with rotation.       Previous Goals:  1 Patient to report >= 75% improvement in symptoms with ADLs. 2 Patient will be independent with finalized HEP/ self maintenance. 3 Increase FOTO score >= 65% to indicate improved function with use of CS.  4 Restore full AROM for improved ADL participation.                  Prior Level/Current Level:  1) Prior Level: na              Current Level: 70%              Goal Met? progressing  2) Prior Level: na              Current Level: patient has been provided HEP               Goal Met? Partially met  3) Prior Level: 47              Current Level: 68              Goal Met? yes  4) Prior Level: Cx ROM: flex= 38, ext= 37, R/L rot= 55/57 degrees              Current Level: WNLs, but pain with R rotation              Goal Met? Partially met          Assessments/Recommendations: Odischarge from PT     If you have any questions/comments please contact us directly at [phone].    Thank you for allowing us to assist in the care of your patient.     Therapist Signature: Zahraa Hiutron, PT Date: 3/22/2021   Reporting Period:  Certification Period:

## 2021-03-31 ENCOUNTER — HOSPITAL ENCOUNTER (OUTPATIENT)
Dept: PHYSICAL THERAPY | Age: 31
End: 2021-03-31

## 2021-04-07 ENCOUNTER — HOSPITAL ENCOUNTER (OUTPATIENT)
Dept: PHYSICAL THERAPY | Age: 31
Discharge: HOME | End: 2021-04-07
Payer: COMMERCIAL

## 2021-04-07 PROCEDURE — 97161 PT EVAL LOW COMPLEX 20 MIN: CPT

## 2021-04-07 PROCEDURE — 20560 NDL INSJ W/O NJX 1 OR 2 MUSC: CPT
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
## 2021-04-13 ENCOUNTER — APPOINTMENT (OUTPATIENT)
Dept: PHYSICAL THERAPY | Age: 31
End: 2021-04-13
Payer: COMMERCIAL

## 2021-04-14 ENCOUNTER — HOSPITAL ENCOUNTER (OUTPATIENT)
Dept: PHYSICAL THERAPY | Age: 31
Discharge: HOME | End: 2021-04-14
Payer: COMMERCIAL

## 2021-04-14 ENCOUNTER — APPOINTMENT (OUTPATIENT)
Dept: PHYSICAL THERAPY | Age: 31
End: 2021-04-14
Payer: COMMERCIAL

## 2021-04-14 PROCEDURE — 20560 NDL INSJ W/O NJX 1 OR 2 MUSC: CPT

## 2021-04-14 PROCEDURE — 97530 THERAPEUTIC ACTIVITIES: CPT

## 2021-04-14 PROCEDURE — 97110 THERAPEUTIC EXERCISES: CPT

## 2021-04-14 NOTE — PROGRESS NOTES
PHYSICAL THERAPY - DAILY TREATMENT NOTE    Patient Name: Kaylynn Boucher        Date: 2021  : 1990   YES Patient  Verified  Visit #:      12  Insurance: Payor: BLUE CROSS / Plan: [facility] PPO / Product Type: PPO /      In time: 430 Out time: 525   Total Treatment Time: 50     BCBS/Medicare Time Tracking (below)   Total Timed Codes (min):  40 1:1 Treatment Time:  40     TREATMENT AREA =  Neck pain [M54.2]  Pain in right shoulder [M25.511]    SUBJECTIVE  Pain Level (on 0 to 10 scale):  2-3  / 10   Medication Changes/New allergies or changes in medical history, any new surgeries or procedures? NO    If yes, update Summary List   Subjective Functional Status/Changes:  []  No changes reported     I felt a lot looser after the soreness went away.  I can turn my head farther now before the pain      Modalities Rationale:     decrease inflammation, decrease pain and increase tissue extensibility to improve patient's ability to perform ADLs   min [] Estim, type/location:                                      []  att     []  unatt     []  w/US     []  w/ice    []  w/heat    min []  Mechanical Traction: type/lbs                   []  pro   []  sup   []  int   []  cont    []  before manual    []  after manual    min []  Ultrasound, settings/location:      min []  Iontophoresis w/ dexamethasone, location:                                               []  take home patch       []  in clinic   10 min []  Ice     [x]  Heat    location/position: C/S and R posterior shoulder in supine    min []  Vasopneumatic Device, press/temp:     min []  Other:    [x] Skin assessment post-treatment (if applicable):    [x]  intact    [x]  redness- no adverse reaction     []redness - adverse reaction:        15 min Therapeutic Exercise:  [x]  See flow sheet   Rationale:      increase ROM and increase strength to improve the patients ability to perform unlimited ADLs       10 min Dry Needling:   Rationale: decrease pain, increase ROM, increase tissue extensibility, and decrease trigger points to improve patient's ability to perform ADLs  Select one untimed code below based on number of muscle groups needled:  [x]  CPT 95363:  needle insertion(s) without injection(s); 1 or 2 muscle(s)  []  CPT 99163:  needle insertion(s) without injection(s); 3 or more muscles  Dry Needling Procedure Note    Dry Needle Session Number:  2    Procedure: An intramuscular manual therapy (dry needling) and a neuro-muscular re-education treatment was done to deactivate myofascial trigger points, with a 15/30 gauge solid filament needle, under aseptic technique. Indication(s): [] Muscle spasms [] Myalgia/Myositis  [] Muscle cramps      [] Muscle imbalances [] TMD (TMJ) [] Myofascial pain & dysfunction     [] Other: __    Chart reviewed for the following:  Susie AVELAR, PT, have reviewed the medical history, summary list and precautions/contraindications for Rebecca Lard.      TIME OUT performed immediately prior to start of procedure:  435pm (enter time the timeout was completed)  Susie AVELAR, PT, have performed the following reviews on Rebecca Lard prior to the start of the session:      [x] Patient was identified by name and date of birth    [x] Agreement on all muscles being treated was verified   [x] Purpose of dry needling, side effects, possible complications, risks and benefits were explained to the patient   [x] Procedure site(s) verified  [x] Patient was positioned for comfort and draped for privacy  [x] Informed Consent was signed (initial visit) and verified verbally (subsequent visits)  [x] Patient was instructed on the need to report the use of blood thinners and/or immunosuppressant medications  [x] How to respond to possible adverse effects of treatment  [x] Self treatment of post needling soreness: ice, heat (moist heat, heat wraps) and stretching  [x] Opportunity was given to ask any questions, all questions were answered            Treatment:  The following muscles were treated today:    Right: UT, suboccipitals, infraspinatus   Left: UT     Patients response to todays treatment:   [x]  LTRs  []  Muscle Relaxation  []  Pain Relief  []  Decreased Tinnitus  []  Decreased HAs [x]  Post needling soreness  []  Increased ROM   []  Other:        15 min Therapeutic Activity: [x]  See flow sheet   Rationale:    increase ROM, increase strength, improve coordination and increase proprioception to improve the patients ability to lift, carry, reach and pull     min Neuromuscular Re-ed: [x]  See flow sheet   Rationale:    increase ROM, increase strength, improve coordination, improve balance and increase proprioception to improve the patients postural mechanics    Billed With/As:   [] TE   [] TA   [] Neuro   [] Self Care Patient Education: [x] Review HEP    [] Progressed/Changed HEP based on:   [] positioning   [] body mechanics   [] transfers   [] heat/ice application    [x] other: Issued written HEP and reviewed     Other Objective/Functional Measures: Added sup horz abd and ER, chin tuck and lift  + LTR elicited to muscles to treated with dry needling technique. No adverse reactions from [address] Treatment Pain Level (on 0 to 10) scale:   3  / 10     ASSESSMENT  Assessment/Changes in Function:     Progressed treatment as appropriate with good patient tolerance to DN.  Visible mm juddering of deep cervical neck flexor >3s with chin tuck and lift     []  See Progress Note/Recertification   Patient will continue to benefit from skilled PT services to modify and progress therapeutic interventions, address functional mobility deficits, address ROM deficits, address strength deficits, analyze and address soft tissue restrictions, analyze and cue movement patterns, analyze and modify body mechanics/ergonomics, assess and modify postural abnormalities, address imbalance/dizziness and instruct in home and community integration to attain remaining goals.    Progress toward goals / Updated goals:    Progressing towards STG1     PLAN  [x]  Upgrade activities as tolerated YES Continue plan of care   []  Discharge due to :    []  Other:      Therapist: Riaz Crane, PT, DPT, MTC, CMTPT    Date: 4/14/2021 Time: 8:23 AM     Future Appointments   Date Time Provider Jeannine Garcia   4/20/2021  7:30 AM Chet Shoulder, PT [facility]   4/27/2021  7:30 AM Chet Shoulder, PT [facility]   4/29/2021  7:30 AM Chet Shoulder, PT [facility]

## 2021-04-15 ENCOUNTER — APPOINTMENT (OUTPATIENT)
Dept: PHYSICAL THERAPY | Age: 31
End: 2021-04-15
Payer: COMMERCIAL

## 2021-04-20 ENCOUNTER — HOSPITAL ENCOUNTER (OUTPATIENT)
Dept: PHYSICAL THERAPY | Age: 31
Discharge: HOME | End: 2021-04-20
Payer: COMMERCIAL

## 2021-04-20 PROCEDURE — 20560 NDL INSJ W/O NJX 1 OR 2 MUSC: CPT

## 2021-04-20 PROCEDURE — 97110 THERAPEUTIC EXERCISES: CPT

## 2021-04-20 PROCEDURE — 97530 THERAPEUTIC ACTIVITIES: CPT

## 2021-04-20 NOTE — PROGRESS NOTES
PHYSICAL THERAPY - DAILY TREATMENT NOTE    Patient Name: Lindsey Hidden        Date: 2021  : 1990   YES Patient  Verified  Visit #:   3   of   12  Insurance: Payor: Maninder Drummond / Plan: [facility] PPO / Product Type: PPO /      In time: 730 Out time: 835   Total Treatment Time: 60     BCBS/Medicare Time Tracking (below)   Total Timed Codes (min):  50 1:1 Treatment Time:  45     TREATMENT AREA =  Neck pain [M54.2]  Pain in right shoulder [M25.511]    SUBJECTIVE  Pain Level (on 0 to 10 scale):  2  / 10   Medication Changes/New allergies or changes in medical history, any new surgeries or procedures?     NO    If yes, update Summary List   Subjective Functional Status/Changes:  []  No changes reported     No sharp pain now if I turn my head slowly      Modalities Rationale:     decrease inflammation, decrease pain and increase tissue extensibility to improve patient's ability to perform ADLs   min [] Estim, type/location:                                      []  att     []  unatt     []  w/US     []  w/ice    []  w/heat    min []  Mechanical Traction: type/lbs                   []  pro   []  sup   []  int   []  cont    []  before manual    []  after manual    min []  Ultrasound, settings/location:      min []  Iontophoresis w/ dexamethasone, location:                                               []  take home patch       []  in clinic   10 min []  Ice     [x]  Heat    location/position: C/S in supine    min []  Vasopneumatic Device, press/temp:     min []  Other:    [x] Skin assessment post-treatment (if applicable):    [x]  intact    [x]  redness- no adverse reaction     []redness - adverse reaction:        20/15 min Therapeutic Exercise:  [x]  See flow sheet   Rationale:      increase ROM and increase strength to improve the patients ability to perform unlimited ADLs       15 min Dry Needling:   Rationale:      decrease pain, increase ROM, increase tissue extensibility, and decrease trigger points to improve patient's ability to perform ADLs  Select one untimed code below based on number of muscle groups needled:  [x]  CPT 34223:  needle insertion(s) without injection(s); 1 or 2 muscle(s)  []  CPT 57328:  needle insertion(s) without injection(s); 3 or more muscles  Dry Needling Procedure Note    Dry Needle Session Number:  3    Procedure: An intramuscular manual therapy (dry needling) and a neuro-muscular re-education treatment was done to deactivate myofascial trigger points, with a 15/30 gauge solid filament needle, under aseptic technique. Indication(s): [] Muscle spasms [] Myalgia/Myositis  [] Muscle cramps      [] Muscle imbalances [] TMD (TMJ) [] Myofascial pain & dysfunction     [] Other: __    Chart reviewed for the following:  Ssuie AVELAR, PT, have reviewed the medical history, summary list and precautions/contraindications for Jettie Columbia.      TIME OUT performed immediately prior to start of procedure:  740am (enter time the timeout was completed)  Susie AVELAR, PT, have performed the following reviews on Jettie Columbia prior to the start of the session:      [x] Patient was identified by name and date of birth    [x] Agreement on all muscles being treated was verified   [x] Purpose of dry needling, side effects, possible complications, risks and benefits were explained to the patient   [x] Procedure site(s) verified  [x] Patient was positioned for comfort and draped for privacy  [x] Informed Consent was signed (initial visit) and verified verbally (subsequent visits)  [x] Patient was instructed on the need to report the use of blood thinners and/or immunosuppressant medications  [x] How to respond to possible adverse effects of treatment  [x] Self treatment of post needling soreness: ice, heat (moist heat, heat wraps) and stretching  [x] Opportunity was given to ask any questions, all questions were answered            Treatment:  The following muscles were treated today:    Right: UT, suboccipitals   Left: UT, suboccipitals     Patients response to todays treatment:   [x]  LTRs  []  Muscle Relaxation  []  Pain Relief  []  Decreased Tinnitus  []  Decreased HAs [x]  Post needling soreness  []  Increased ROM   []  Other:        15 min Therapeutic Activity: [x]  See flow sheet   Rationale:    increase ROM, increase strength, improve coordination and increase proprioception to improve the patients ability to lift, carry, reach and pull     min Neuromuscular Re-ed: [x]  See flow sheet   Rationale:    increase ROM, increase strength, improve coordination, improve balance and increase proprioception to improve the patients postural mechanics    Billed With/As:   [] TE   [] TA   [] Neuro   [] Self Care Patient Education: [x] Review HEP    [] Progressed/Changed HEP based on:   [] positioning   [] body mechanics   [] transfers   [] heat/ice application    [x] other: Issued written HEP and reviewed     Other Objective/Functional Measures: Added T/S ext and book opener stretch, dec B T/S rot L>R  + LTR elicited to muscles to treated with dry needling technique. No adverse reactions from [address] Treatment Pain Level (on 0 to 10) scale:   2  / 10     ASSESSMENT  Assessment/Changes in Function:     Progressed treatment as appropriate with good patient tolerance to thoracic mobilizations. Improved quality of mvmt during c/s AROM rotation     []  See Progress Note/Recertification   Patient will continue to benefit from skilled PT services to modify and progress therapeutic interventions, address functional mobility deficits, address ROM deficits, address strength deficits, analyze and address soft tissue restrictions, analyze and cue movement patterns, analyze and modify body mechanics/ergonomics, assess and modify postural abnormalities, address imbalance/dizziness and instruct in home and community integration to attain remaining goals.    Progress toward goals / Updated goals:    met STG1     PLAN  [x]  Upgrade activities as tolerated YES Continue plan of care   []  Discharge due to :    []  Other:      Therapist: Gemma Jarrett, PT, DPT, MTC, CMTPT    Date: 4/20/2021 Time: 8:23 AM     Future Appointments   Date Time Provider Jeannine Garcia   4/27/2021  7:30 AM [facility] [address]   4/29/2021  7:30 AM [facility] [address]

## 2021-04-27 ENCOUNTER — HOSPITAL ENCOUNTER (OUTPATIENT)
Dept: PHYSICAL THERAPY | Age: 31
Discharge: HOME | End: 2021-04-27
Payer: COMMERCIAL

## 2021-04-27 PROCEDURE — 20560 NDL INSJ W/O NJX 1 OR 2 MUSC: CPT

## 2021-04-27 PROCEDURE — 97110 THERAPEUTIC EXERCISES: CPT

## 2021-04-27 PROCEDURE — 97530 THERAPEUTIC ACTIVITIES: CPT

## 2021-04-27 NOTE — PROGRESS NOTES
PHYSICAL THERAPY - DAILY TREATMENT NOTE    Patient Name: Yang Rater        Date: 2021  : 1990   YES Patient  Verified  Visit #:      12  Insurance: Payor: Kirt Lorenzo / Plan: [facility] PPO / Product Type: PPO /      In time: 730 Out time: 830   Total Treatment Time: 55     BCBS/Medicare Time Tracking (below)   Total Timed Codes (min):  45 1:1 Treatment Time:  45     TREATMENT AREA =  Neck pain [M54.2]  Pain in right shoulder [M25.511]    SUBJECTIVE  Pain Level (on 0 to 10 scale):  1-2  / 10   Medication Changes/New allergies or changes in medical history, any new surgeries or procedures? NO    If yes, update Summary List   Subjective Functional Status/Changes:  []  No changes reported     Still feel tightness when turning but looking up and down is better and overall the ROM is improving.       Modalities Rationale:     decrease inflammation, decrease pain and increase tissue extensibility to improve patient's ability to perform ADLs   min [] Estim, type/location:                                      []  att     []  unatt     []  w/US     []  w/ice    []  w/heat    min []  Mechanical Traction: type/lbs                   []  pro   []  sup   []  int   []  cont    []  before manual    []  after manual    min []  Ultrasound, settings/location:      min []  Iontophoresis w/ dexamethasone, location:                                               []  take home patch       []  in clinic   10 min []  Ice     [x]  Heat    location/position: C/S in supine    min []  Vasopneumatic Device, press/temp:     min []  Other:    [x] Skin assessment post-treatment (if applicable):    [x]  intact    [x]  redness- no adverse reaction     []redness - adverse reaction:        15 min Therapeutic Exercise:  [x]  See flow sheet   Rationale:      increase ROM and increase strength to improve the patients ability to perform unlimited ADLs       10 min Dry Needling:   Rationale:      decrease pain, increase ROM, increase tissue extensibility, and decrease trigger points to improve patient's ability to perform ADLs  Select one untimed code below based on number of muscle groups needled:  [x]  CPT 65698:  needle insertion(s) without injection(s); 1 or 2 muscle(s)  []  CPT 77538:  needle insertion(s) without injection(s); 3 or more muscles  Dry Needling Procedure Note    Dry Needle Session Number:  4    Procedure: An intramuscular manual therapy (dry needling) and a neuro-muscular re-education treatment was done to deactivate myofascial trigger points, with a 15/30 gauge solid filament needle, under aseptic technique. Indication(s): [] Muscle spasms [] Myalgia/Myositis  [] Muscle cramps      [] Muscle imbalances [] TMD (TMJ) [] Myofascial pain & dysfunction     [] Other: __    Chart reviewed for the following:  ISusie, PT, have reviewed the medical history, summary list and precautions/contraindications for Jame Montejo.      TIME OUT performed immediately prior to start of procedure:  740am (enter time the timeout was completed)  Susie AVELAR, PT, have performed the following reviews on Jame Montejo prior to the start of the session:      [x] Patient was identified by name and date of birth    [x] Agreement on all muscles being treated was verified   [x] Purpose of dry needling, side effects, possible complications, risks and benefits were explained to the patient   [x] Procedure site(s) verified  [x] Patient was positioned for comfort and draped for privacy  [x] Informed Consent was signed (initial visit) and verified verbally (subsequent visits)  [x] Patient was instructed on the need to report the use of blood thinners and/or immunosuppressant medications  [x] How to respond to possible adverse effects of treatment  [x] Self treatment of post needling soreness: ice, heat (moist heat, heat wraps) and stretching  [x] Opportunity was given to ask any questions, all questions were answered            Treatment:  The following muscles were treated today:    Right: UT, mud cervical multifidi    Left:      Patients response to todays treatment:   [x]  LTRs  []  Muscle Relaxation  []  Pain Relief  []  Decreased Tinnitus  []  Decreased HAs [x]  Post needling soreness  []  Increased ROM   []  Other:        20 min Therapeutic Activity: [x]  See flow sheet   Rationale:    increase ROM, increase strength, improve coordination and increase proprioception to improve the patients ability to lift, carry, reach and pull     min Neuromuscular Re-ed: [x]  See flow sheet   Rationale:    increase ROM, increase strength, improve coordination, improve balance and increase proprioception to improve the patients postural mechanics    Billed With/As:   [x] TE   [x] TA   [] Neuro   [] Self Care Patient Education: [x] Review HEP    [] Progressed/Changed HEP based on:   [] positioning   [] body mechanics   [] transfers   [] heat/ice application    [x] other: OA nod and c/s retract in supine     Other Objective/Functional Measures:    Rot R 75% with R sided ERP and R UT restriction from UT during SB L  + LTR elicited to muscles to treated with dry needling technique. No adverse reactions from [address] Treatment Pain Level (on 0 to 10) scale:   1-2  / 10     ASSESSMENT  Assessment/Changes in Function:     Dec c/o ERP following DN. Repeated cueing needed for proper form and isolation during C/S retract.  Difficulty keeping UT relaxed     []  See Progress Note/Recertification   Patient will continue to benefit from skilled PT services to modify and progress therapeutic interventions, address functional mobility deficits, address ROM deficits, address strength deficits, analyze and address soft tissue restrictions, analyze and cue movement patterns, analyze and modify body mechanics/ergonomics, assess and modify postural abnormalities, address imbalance/dizziness and instruct in home and community integration to attain remaining goals.    Progress toward goals / Updated goals:    Progressing towards LTG3     PLAN  [x]  Upgrade activities as tolerated YES Continue plan of care   []  Discharge due to :    []  Other:      Therapist: Minh Chávez PT, DPT, MTC, CMTPT    Date: 4/27/2021 Time: 8:23 AM     Future Appointments   Date Time Provider Jeannine Garcia   4/29/2021  7:30 AM Fiorella Brito PT [facility]

## 2021-04-29 ENCOUNTER — HOSPITAL ENCOUNTER (OUTPATIENT)
Dept: PHYSICAL THERAPY | Age: 31
Discharge: HOME | End: 2021-04-29
Payer: COMMERCIAL

## 2021-04-29 PROCEDURE — 97530 THERAPEUTIC ACTIVITIES: CPT

## 2021-04-29 PROCEDURE — 20560 NDL INSJ W/O NJX 1 OR 2 MUSC: CPT

## 2021-04-29 PROCEDURE — 97110 THERAPEUTIC EXERCISES: CPT

## 2021-04-29 NOTE — PROGRESS NOTES
PHYSICAL THERAPY - DAILY TREATMENT NOTE    Patient Name: Stefany Lopez        Date: 2021  : 1990   YES Patient  Verified  Visit #:     Insurance: Payor: Vishal Rojas / Plan: [facility] PPO / Product Type: PPO /      In time: 730 Out time: 835   Total Treatment Time: 60     BCBS/Medicare Time Tracking (below)   Total Timed Codes (min):  50 1:1 Treatment Time:  45     TREATMENT AREA =  Neck pain [M54.2]  Pain in right shoulder [M25.511]    SUBJECTIVE  Pain Level (on 0 to 10 scale):  1-2  / 10   Medication Changes/New allergies or changes in medical history, any new surgeries or procedures?     NO    If yes, update Summary List   Subjective Functional Status/Changes:  []  No changes reported     The needling last time really helped      Modalities Rationale:     decrease inflammation, decrease pain and increase tissue extensibility to improve patient's ability to perform ADLs   min [] Estim, type/location:                                      []  att     []  unatt     []  w/US     []  w/ice    []  w/heat    min []  Mechanical Traction: type/lbs                   []  pro   []  sup   []  int   []  cont    []  before manual    []  after manual    min []  Ultrasound, settings/location:      min []  Iontophoresis w/ dexamethasone, location:                                               []  take home patch       []  in clinic   10 min []  Ice     [x]  Heat    location/position: C/S in supine    min []  Vasopneumatic Device, press/temp:     min []  Other:    [x] Skin assessment post-treatment (if applicable):    [x]  intact    [x]  redness- no adverse reaction     []redness - adverse reaction:        25 min Therapeutic Exercise:  [x]  See flow sheet   Rationale:      increase ROM and increase strength to improve the patients ability to perform unlimited ADLs       10 min Dry Needling:   Rationale:      decrease pain, increase ROM, increase tissue extensibility, and decrease trigger points to improve patient's ability to perform ADLs  Select one untimed code below based on number of muscle groups needled:  [x]  CPT 86773:  needle insertion(s) without injection(s); 1 or 2 muscle(s)  []  CPT 67934:  needle insertion(s) without injection(s); 3 or more muscles  Dry Needling Procedure Note    Dry Needle Session Number:  5    Procedure: An intramuscular manual therapy (dry needling) and a neuro-muscular re-education treatment was done to deactivate myofascial trigger points, with a 15/30 gauge solid filament needle, under aseptic technique. Indication(s): [] Muscle spasms [] Myalgia/Myositis  [] Muscle cramps      [] Muscle imbalances [] TMD (TMJ) [] Myofascial pain & dysfunction     [] Other: __    Chart reviewed for the following:  ISusie, PT, have reviewed the medical history, summary list and precautions/contraindications for Kaylynn Boucher.      TIME OUT performed immediately prior to start of procedure:  740am (enter time the timeout was completed)  Susie AVELAR, PT, have performed the following reviews on Kaylynn Boucher prior to the start of the session:      [x] Patient was identified by name and date of birth    [x] Agreement on all muscles being treated was verified   [x] Purpose of dry needling, side effects, possible complications, risks and benefits were explained to the patient   [x] Procedure site(s) verified  [x] Patient was positioned for comfort and draped for privacy  [x] Informed Consent was signed (initial visit) and verified verbally (subsequent visits)  [x] Patient was instructed on the need to report the use of blood thinners and/or immunosuppressant medications  [x] How to respond to possible adverse effects of treatment  [x] Self treatment of post needling soreness: ice, heat (moist heat, heat wraps) and stretching  [x] Opportunity was given to ask any questions, all questions were answered            Treatment:  The following muscles were treated today:    Right: mid cervical multifidi    Left: Mid cervical multifidi     Patients response to todays treatment:   [x]  LTRs  []  Muscle Relaxation  []  Pain Relief  []  Decreased Tinnitus  []  Decreased HAs [x]  Post needling soreness  []  Increased ROM   []  Other:        15/10 min Therapeutic Activity: [x]  See flow sheet   Rationale:    increase ROM, increase strength, improve coordination and increase proprioception to improve the patients ability to lift, carry, reach and pull    Billed With/As:   [x] TE   [x] TA   [] Neuro   [] Self Care Patient Education: [x] Review HEP    [] Progressed/Changed HEP based on:   [] positioning   [] body mechanics   [] transfers   [] heat/ice application    [] other:      Other Objective/Functional Measures: Added prone c/s rot, sup c/s rot and tband row with rot  + LTR elicited to muscles to treated with dry needling technique. No adverse reactions from [address] Treatment Pain Level (on 0 to 10) scale:   1-2  / 10     ASSESSMENT  Assessment/Changes in Function:     Improved pain free C/S AROM into rot following DN and when cued to prevent compensatory subcranial hyperext     []  See Progress Note/Recertification   Patient will continue to benefit from skilled PT services to modify and progress therapeutic interventions, address functional mobility deficits, address ROM deficits, address strength deficits, analyze and address soft tissue restrictions, analyze and cue movement patterns, analyze and modify body mechanics/ergonomics, assess and modify postural abnormalities, address imbalance/dizziness and instruct in home and community integration to attain remaining goals.    Progress toward goals / Updated goals:    Met STG3     PLAN  [x]  Upgrade activities as tolerated YES Continue plan of care   []  Discharge due to :    []  Other:      Therapist: Jose Serra, PT, DPT, MTC, CMTPT    Date: 4/29/2021 Time: 8:23 AM     Future Appointments   Date Time Provider Jeannine Garcia   5/4/2021  7:30 AM Adam Gibbs, PT [facility]

## 2021-05-04 ENCOUNTER — HOSPITAL ENCOUNTER (OUTPATIENT)
Dept: PHYSICAL THERAPY | Age: 31
Discharge: HOME | End: 2021-05-04
Payer: COMMERCIAL

## 2021-05-04 PROCEDURE — 97110 THERAPEUTIC EXERCISES: CPT

## 2021-05-04 PROCEDURE — 97530 THERAPEUTIC ACTIVITIES: CPT

## 2021-05-04 PROCEDURE — 20560 NDL INSJ W/O NJX 1 OR 2 MUSC: CPT

## 2021-05-04 NOTE — PROGRESS NOTES
PHYSICAL THERAPY - DAILY TREATMENT NOTE    Patient Name: Flo Lam        Date: 2021  : 1990   YES Patient  Verified  Visit #:     Insurance: Payor: Chan Allen / Plan: [facility] PPO / Product Type: PPO /      In time: 730 Out time: 835   Total Treatment Time: 60     BCBS/Medicare Time Tracking (below)   Total Timed Codes (min):  50 1:1 Treatment Time:  45     TREATMENT AREA =  Neck pain [M54.2]  Pain in right shoulder [M25.511]    SUBJECTIVE  Pain Level (on 0 to 10 scale):  1-2  / 10   Medication Changes/New allergies or changes in medical history, any new surgeries or procedures?     NO    If yes, update Summary List   Subjective Functional Status/Changes:  []  No changes reported     The needling last time really helped      Modalities Rationale:     decrease inflammation, decrease pain and increase tissue extensibility to improve patient's ability to perform ADLs   min [] Estim, type/location:                                      []  att     []  unatt     []  w/US     []  w/ice    []  w/heat    min []  Mechanical Traction: type/lbs                   []  pro   []  sup   []  int   []  cont    []  before manual    []  after manual    min []  Ultrasound, settings/location:      min []  Iontophoresis w/ dexamethasone, location:                                               []  take home patch       []  in clinic   10 min []  Ice     [x]  Heat    location/position: C/S in supine    min []  Vasopneumatic Device, press/temp:     min []  Other:    [x] Skin assessment post-treatment (if applicable):    [x]  intact    [x]  redness- no adverse reaction     []redness  adverse reaction:        25 min Therapeutic Exercise:  [x]  See flow sheet   Rationale:      increase ROM and increase strength to improve the patients ability to perform unlimited ADLs       10 min Dry Needling:   Rationale:      decrease pain, increase ROM, increase tissue extensibility, and decrease trigger points to improve patient's ability to perform ADLs  Select one untimed code below based on number of muscle groups needled:  [x]  CPT 89389:  needle insertion(s) without injection(s); 1 or 2 muscle(s)  []  CPT 40222:  needle insertion(s) without injection(s); 3 or more muscles  Dry Needling Procedure Note    Dry Needle Session Number:  5    Procedure: An intramuscular manual therapy (dry needling) and a neuro-muscular re-education treatment was done to deactivate myofascial trigger points, with a 15/30 gauge solid filament needle, under aseptic technique. Indication(s): [] Muscle spasms [] Myalgia/Myositis  [] Muscle cramps      [] Muscle imbalances [] TMD (TMJ) [] Myofascial pain & dysfunction     [] Other: __    Chart reviewed for the following:  Susie AVELAR, PT, have reviewed the medical history, summary list and precautions/contraindications for [facility].      TIME OUT performed immediately prior to start of procedure:  740am (enter time the timeout was completed)  Susie AVELAR, PT, have performed the following reviews on Ar[facility] prior to the start of the session:      [x] Patient was identified by name and date of birth    [x] Agreement on all muscles being treated was verified   [x] Purpose of dry needling, side effects, possible complications, risks and benefits were explained to the patient   [x] Procedure site(s) verified  [x] Patient was positioned for comfort and draped for privacy  [x] Informed Consent was signed (initial visit) and verified verbally (subsequent visits)  [x] Patient was instructed on the need to report the use of blood thinners and/or immunosuppressant medications  [x] How to respond to possible adverse effects of treatment  [x] Self treatment of post needling soreness: ice, heat (moist heat, heat wraps) and stretching  [x] Opportunity was given to ask any questions, all questions were answered            Treatment:  The following muscles were treated today:    Right: mid cervical multifidi    Left: Mid cervical multifidi     Patients response to todays treatment:   [x]  LTRs  []  Muscle Relaxation  []  Pain Relief  []  Decreased Tinnitus  []  Decreased HAs [x]  Post needling soreness  []  Increased ROM   []  Other:        15/10 min Therapeutic Activity: [x]  See flow sheet   Rationale:    increase ROM, increase strength, improve coordination and increase proprioception to improve the patients ability to lift, carry, reach and pull    Billed With/As:   [x] TE   [x] TA   [] Neuro   [] Self Care Patient Education: [x] Review HEP    [] Progressed/Changed HEP based on:   [] positioning   [] body mechanics   [] transfers   [] heat/ice application    [] other:      Other Objective/Functional Measures: Added prone c/s rot, sup c/s rot and tband row with rot  + LTR elicited to muscles to treated with dry needling technique. No adverse reactions from [address] Treatment Pain Level (on 0 to 10) scale:   1-2  / 10     ASSESSMENT  Assessment/Changes in Function:     Improved pain free C/S AROM into rot following DN and when cued to prevent compensatory subcranial hyperext     []  See Progress Note/Recertification   Patient will continue to benefit from skilled PT services to modify and progress therapeutic interventions, address functional mobility deficits, address ROM deficits, address strength deficits, analyze and address soft tissue restrictions, analyze and cue movement patterns, analyze and modify body mechanics/ergonomics, assess and modify postural abnormalities, address imbalance/dizziness and instruct in home and community integration to attain remaining goals. Progress toward goals / Updated goals:    Met STG3     PLAN  [x]  Upgrade activities as tolerated YES Continue plan of care   []  Discharge due to :    []  Other:      Therapist: Nathaly Tellez, PT, DPT, MTC, CMTPT    Date: 5/4/2021 Time: 8:23 AM     No future appointments.

## 2021-07-12 ENCOUNTER — APPOINTMENT (OUTPATIENT)
Dept: PHYSICAL THERAPY | Age: 31
End: 2021-07-12

## 2021-07-19 ENCOUNTER — APPOINTMENT (OUTPATIENT)
Dept: PHYSICAL THERAPY | Age: 31
End: 2021-07-19

## 2021-07-26 ENCOUNTER — APPOINTMENT (OUTPATIENT)
Dept: PHYSICAL THERAPY | Age: 31
End: 2021-07-26